# Patient Record
Sex: FEMALE | Race: WHITE | Employment: OTHER | URBAN - METROPOLITAN AREA
[De-identification: names, ages, dates, MRNs, and addresses within clinical notes are randomized per-mention and may not be internally consistent; named-entity substitution may affect disease eponyms.]

---

## 2019-01-23 ENCOUNTER — HOSPITAL ENCOUNTER (EMERGENCY)
Age: 74
Discharge: HOME | End: 2019-01-23
Payer: MEDICARE

## 2019-01-23 ENCOUNTER — APPOINTMENT (OUTPATIENT)
Dept: GENERAL RADIOLOGY | Age: 74
End: 2019-01-23
Payer: MEDICARE

## 2019-01-23 VITALS
DIASTOLIC BLOOD PRESSURE: 72 MMHG | OXYGEN SATURATION: 93 % | WEIGHT: 205 LBS | BODY MASS INDEX: 35.63 KG/M2 | TEMPERATURE: 98.2 F | HEART RATE: 77 BPM | RESPIRATION RATE: 18 BRPM | SYSTOLIC BLOOD PRESSURE: 123 MMHG

## 2019-01-23 DIAGNOSIS — J40 BRONCHITIS: Primary | ICD-10-CM

## 2019-01-23 PROCEDURE — 71046 X-RAY EXAM CHEST 2 VIEWS: CPT

## 2019-01-23 PROCEDURE — 99213 OFFICE O/P EST LOW 20 MIN: CPT

## 2019-01-23 PROCEDURE — 6370000000 HC RX 637 (ALT 250 FOR IP): Performed by: NURSE PRACTITIONER

## 2019-01-23 PROCEDURE — 94640 AIRWAY INHALATION TREATMENT: CPT

## 2019-01-23 RX ORDER — METHYLPREDNISOLONE 4 MG/1
TABLET ORAL
Qty: 21 TABLET | Status: SHIPPED | OUTPATIENT
Start: 2019-01-23 | End: 2019-01-29

## 2019-01-23 RX ORDER — IPRATROPIUM BROMIDE AND ALBUTEROL SULFATE 2.5; .5 MG/3ML; MG/3ML
1 SOLUTION RESPIRATORY (INHALATION) ONCE
Status: COMPLETED | OUTPATIENT
Start: 2019-01-23 | End: 2019-01-23

## 2019-01-23 RX ORDER — AZITHROMYCIN 250 MG/1
TABLET, FILM COATED ORAL
Qty: 6 TABLET | Refills: 0 | Status: SHIPPED | OUTPATIENT
Start: 2019-01-23 | End: 2019-02-02

## 2019-01-23 RX ORDER — BENZONATATE 100 MG/1
100 CAPSULE ORAL 3 TIMES DAILY PRN
Qty: 21 CAPSULE | Refills: 0 | Status: SHIPPED | OUTPATIENT
Start: 2019-01-23 | End: 2019-01-30

## 2019-01-23 RX ADMIN — IPRATROPIUM BROMIDE AND ALBUTEROL SULFATE 1 AMPULE: .5; 3 SOLUTION RESPIRATORY (INHALATION) at 13:01

## 2020-06-04 ENCOUNTER — HOSPITAL ENCOUNTER (OUTPATIENT)
Dept: MAMMOGRAPHY | Age: 75
Discharge: HOME | End: 2020-06-06
Payer: MEDICARE

## 2020-06-04 PROCEDURE — 77080 DXA BONE DENSITY AXIAL: CPT

## 2020-06-12 ENCOUNTER — HOSPITAL ENCOUNTER (OUTPATIENT)
Age: 75
Discharge: HOME | End: 2020-06-14
Payer: MEDICARE

## 2020-06-12 LAB
ALBUMIN SERPL-MCNC: 4.3 G/DL (ref 3.5–5.2)
ALP BLD-CCNC: 43 U/L (ref 35–104)
ALT SERPL-CCNC: 17 U/L (ref 0–32)
ANION GAP SERPL CALCULATED.3IONS-SCNC: 10 MMOL/L (ref 7–16)
AST SERPL-CCNC: 22 U/L (ref 0–31)
BASOPHILS ABSOLUTE: 0.04 E9/L (ref 0–0.2)
BASOPHILS RELATIVE PERCENT: 0.6 % (ref 0–2)
BILIRUB SERPL-MCNC: 0.6 MG/DL (ref 0–1.2)
BUN BLDV-MCNC: 24 MG/DL (ref 8–23)
CALCIUM SERPL-MCNC: 10 MG/DL (ref 8.6–10.2)
CHLORIDE BLD-SCNC: 101 MMOL/L (ref 98–107)
CHOLESTEROL, TOTAL: 149 MG/DL (ref 0–199)
CO2: 27 MMOL/L (ref 22–29)
CREAT SERPL-MCNC: 0.8 MG/DL (ref 0.5–1)
EOSINOPHILS ABSOLUTE: 0.25 E9/L (ref 0.05–0.5)
EOSINOPHILS RELATIVE PERCENT: 3.7 % (ref 0–6)
GFR AFRICAN AMERICAN: >60
GFR NON-AFRICAN AMERICAN: >60 ML/MIN/1.73
GLUCOSE BLD-MCNC: 106 MG/DL (ref 74–99)
HCT VFR BLD CALC: 42.7 % (ref 34–48)
HDLC SERPL-MCNC: 41 MG/DL
HEMOGLOBIN: 13.4 G/DL (ref 11.5–15.5)
IMMATURE GRANULOCYTES #: 0.03 E9/L
IMMATURE GRANULOCYTES %: 0.4 % (ref 0–5)
LDL CHOLESTEROL CALCULATED: 78 MG/DL (ref 0–99)
LYMPHOCYTES ABSOLUTE: 1.87 E9/L (ref 1.5–4)
LYMPHOCYTES RELATIVE PERCENT: 27.5 % (ref 20–42)
MCH RBC QN AUTO: 29.3 PG (ref 26–35)
MCHC RBC AUTO-ENTMCNC: 31.4 % (ref 32–34.5)
MCV RBC AUTO: 93.2 FL (ref 80–99.9)
MONOCYTES ABSOLUTE: 0.7 E9/L (ref 0.1–0.95)
MONOCYTES RELATIVE PERCENT: 10.3 % (ref 2–12)
NEUTROPHILS ABSOLUTE: 3.9 E9/L (ref 1.8–7.3)
NEUTROPHILS RELATIVE PERCENT: 57.5 % (ref 43–80)
PDW BLD-RTO: 14 FL (ref 11.5–15)
PLATELET # BLD: 204 E9/L (ref 130–450)
PMV BLD AUTO: 11 FL (ref 7–12)
POTASSIUM SERPL-SCNC: 4.9 MMOL/L (ref 3.5–5)
RBC # BLD: 4.58 E12/L (ref 3.5–5.5)
SODIUM BLD-SCNC: 138 MMOL/L (ref 132–146)
TOTAL PROTEIN: 6.9 G/DL (ref 6.4–8.3)
TRIGL SERPL-MCNC: 148 MG/DL (ref 0–149)
TSH SERPL DL<=0.05 MIU/L-ACNC: 2.69 UIU/ML (ref 0.27–4.2)
VLDLC SERPL CALC-MCNC: 30 MG/DL
WBC # BLD: 6.8 E9/L (ref 4.5–11.5)

## 2020-06-12 PROCEDURE — 80061 LIPID PANEL: CPT

## 2020-06-12 PROCEDURE — 36415 COLL VENOUS BLD VENIPUNCTURE: CPT

## 2020-06-12 PROCEDURE — 85025 COMPLETE CBC W/AUTO DIFF WBC: CPT

## 2020-06-12 PROCEDURE — 84436 ASSAY OF TOTAL THYROXINE: CPT

## 2020-06-12 PROCEDURE — 80053 COMPREHEN METABOLIC PANEL: CPT

## 2020-06-12 PROCEDURE — 84443 ASSAY THYROID STIM HORMONE: CPT

## 2020-06-13 LAB — T4 TOTAL: 8.4 MCG/DL (ref 4.5–11.7)

## 2021-01-22 ENCOUNTER — HOSPITAL ENCOUNTER (EMERGENCY)
Age: 76
Discharge: HOME | End: 2021-01-22
Attending: EMERGENCY MEDICINE
Payer: MEDICARE

## 2021-01-22 VITALS
DIASTOLIC BLOOD PRESSURE: 84 MMHG | RESPIRATION RATE: 20 BRPM | OXYGEN SATURATION: 96 % | TEMPERATURE: 97 F | HEART RATE: 73 BPM | SYSTOLIC BLOOD PRESSURE: 139 MMHG

## 2021-01-22 DIAGNOSIS — R19.8 SENSATION OF FOREIGN BODY IN ESOPHAGUS: ICD-10-CM

## 2021-01-22 DIAGNOSIS — R13.10 DYSPHAGIA, UNSPECIFIED TYPE: Primary | ICD-10-CM

## 2021-01-22 PROCEDURE — 6360000002 HC RX W HCPCS: Performed by: STUDENT IN AN ORGANIZED HEALTH CARE EDUCATION/TRAINING PROGRAM

## 2021-01-22 PROCEDURE — 96374 THER/PROPH/DIAG INJ IV PUSH: CPT

## 2021-01-22 PROCEDURE — 2500000003 HC RX 250 WO HCPCS: Performed by: STUDENT IN AN ORGANIZED HEALTH CARE EDUCATION/TRAINING PROGRAM

## 2021-01-22 PROCEDURE — 6370000000 HC RX 637 (ALT 250 FOR IP): Performed by: STUDENT IN AN ORGANIZED HEALTH CARE EDUCATION/TRAINING PROGRAM

## 2021-01-22 PROCEDURE — C9113 INJ PANTOPRAZOLE SODIUM, VIA: HCPCS | Performed by: STUDENT IN AN ORGANIZED HEALTH CARE EDUCATION/TRAINING PROGRAM

## 2021-01-22 PROCEDURE — 99284 EMERGENCY DEPT VISIT MOD MDM: CPT

## 2021-01-22 PROCEDURE — 96375 TX/PRO/DX INJ NEW DRUG ADDON: CPT

## 2021-01-22 RX ORDER — PANTOPRAZOLE SODIUM 40 MG/10ML
40 INJECTION, POWDER, LYOPHILIZED, FOR SOLUTION INTRAVENOUS ONCE
Status: COMPLETED | OUTPATIENT
Start: 2021-01-22 | End: 2021-01-22

## 2021-01-22 RX ORDER — PIOGLITAZONEHYDROCHLORIDE 30 MG/1
30 TABLET ORAL DAILY
COMMUNITY

## 2021-01-22 RX ADMIN — GLUCAGON HYDROCHLORIDE 1 MG: 1 INJECTION, POWDER, FOR SOLUTION INTRAMUSCULAR; INTRAVENOUS; SUBCUTANEOUS at 10:40

## 2021-01-22 RX ADMIN — LIDOCAINE HYDROCHLORIDE: 20 SOLUTION ORAL; TOPICAL at 10:39

## 2021-01-22 RX ADMIN — PANTOPRAZOLE SODIUM 40 MG: 40 INJECTION, POWDER, FOR SOLUTION INTRAVENOUS at 10:40

## 2021-01-22 NOTE — ED PROVIDER NOTES
The patient is a 66year-old-female who presents to the Emergency Department complaining of dysphagia. She states that she feels like something is stuck in her throat. Her symptoms were sudden in onset this morning just prior to arrival, constant, and mild in severity. Nothing makes it better or worse. She states that she ate an egg and toast this morning, and she felt like something was stuck shortly after that. This has happened to her multiple times in the past. She typically sees GI, Dr. Johan Sawyer. She states he has dilated her esophagus multiple times. She hasn't seen him in a \"while. \" She did try drinking water at home, but immediately spit it back out and was unable to keep it down. She states that her  also tried to push on her back without any relief. Nothing makes it better or worse. She denies any fever, chills, vomiting, diarrhea, abdominal pain, chest pain, shortness of breath, difficulty swallowing, or other acute symptoms or concerns. The history is provided by the patient and the spouse. Review of Systems   Constitutional: Negative for chills, fatigue and fever. HENT: Negative for congestion and trouble swallowing. Eyes: Negative for photophobia and visual disturbance. Respiratory: Negative for cough and shortness of breath. Cardiovascular: Negative for chest pain and leg swelling. Gastrointestinal: Negative for abdominal pain, diarrhea, nausea and vomiting. Dysphagia, feels like foreign body is in esophagus     Genitourinary: Negative for dysuria, flank pain, frequency and hematuria. Musculoskeletal: Negative for back pain and neck pain. Skin: Negative for rash and wound. Neurological: Negative for dizziness, syncope, weakness, light-headedness, numbness and headaches. All other systems reviewed and are negative. Physical Exam  Vitals signs and nursing note reviewed. Constitutional:       General: She is not in acute distress.      Appearance: She is well-developed. She is not ill-appearing, toxic-appearing or diaphoretic. HENT:      Head: Normocephalic and atraumatic. Nose: Nose normal.      Mouth/Throat:      Lips: Pink. No lesions. Mouth: Mucous membranes are moist.   Eyes:      General: Lids are normal.   Neck:      Musculoskeletal: Normal range of motion and neck supple. Cardiovascular:      Rate and Rhythm: Normal rate and regular rhythm. Heart sounds: Normal heart sounds, S1 normal and S2 normal. No murmur. Pulmonary:      Effort: Pulmonary effort is normal. No respiratory distress. Breath sounds: Normal breath sounds and air entry. No decreased breath sounds, wheezing, rhonchi or rales. Abdominal:      General: Bowel sounds are normal.      Palpations: Abdomen is soft. Tenderness: There is no abdominal tenderness. There is no guarding or rebound. Skin:     General: Skin is warm and dry. Neurological:      Mental Status: She is alert and oriented to person, place, and time. Motor: No tremor or abnormal muscle tone. Coordination: Coordination normal.          Procedures     MDM  Number of Diagnoses or Management Options  Dysphagia, unspecified type  Diagnosis management comments: The patient is a 66year-old-female who presents to the Emergency Department complaining that something is stuck in her esophagus. She is hemodynamically stable, non-toxic, and in no acute distress. Treated with a dose of glucagon, GI cocktail, and protonix. She was feeling better and able to tolerate some water. Did consult with Dr. Christian Degroot, who states that the patient could come to his office now and he would take a look. Patient's IV was left in place for her to go see Dr. Christian Degroot per his request. Discussed results and plan with patient and  at bedside and they verbalized understanding and agreement to plan and discharge instructions.         ED Course as of Jan 23 0751 Fri Jan 22, 2021   [facility] ATTESTATION:     I have personally performed and/or participated in the history, exam, medical decision making, and procedures and agree with all pertinent clinical information unless otherwise noted. I have also reviewed and agree with the past medical, family and social history unless otherwise noted. I have discussed this patient in detail with the resident, and provided the instruction and education regarding patient here complaining of food in her esophagus. Has a history of strictures, has had 2 have her esophagus dilated several times, sees Dr. Jeanette Beltrán. States for the last couple of months has been gradually worsening, difficulty swallowing with food getting stuck in her throat more often and having to move around and try to get the food to clear and go down. Got even worse overnight and this morning. No actual other chest pain, palpitations or shortness of breath. No abdominal pain. .  My findings/plan: Patient is sitting in the bed resting comfortably no distress controlling her own airway and controlling her own oral secretions. Abdomen soft and nontender. No trismus or stridor. Lungs are clear. Heart rate regular. No focal neurologic deficit. [NC]   5 Consulted with Dr. Kelli Henriquez, GI, who states the patient can come to his office now and he will see the patient. [KG]      ED Course User Index  [KG] Nyasia Roa DO  [NC] Mayte Mack DO          ED Course as of Jan 23 0751 Fri Jan 22, 2021   1021 ATTENDING PROVIDER ATTESTATION:     I have personally performed and/or participated in the history, exam, medical decision making, and procedures and agree with all pertinent clinical information unless otherwise noted. I have also reviewed and agree with the past medical, family and social history unless otherwise noted.     I have discussed this patient in detail with the resident, and provided the instruction and education regarding patient here complaining of AM  ED Bed Assignment:  VANDANA/VANDANA    The nursing notes within the ED encounter and vital signs as below have been reviewed. /84   Pulse 73   Temp 97 °F (36.1 °C) (Temporal)   Resp 20   SpO2 96%   Oxygen Saturation Interpretation: Normal      ------------------------------------------ PROGRESS NOTES ------------------------------------------  4:51 PM EST  I have spoken with the patient and discussed todays results, in addition to providing specific details for the plan of care and counseling regarding the diagnosis and prognosis. Their questions are answered at this time and they are agreeable with the plan. I discussed at length with them reasons for immediate return here for re evaluation. They will followup with their primary care physician by calling their office tomorrow. --------------------------------- ADDITIONAL PROVIDER NOTES ---------------------------------  At this time the patient is without objective evidence of an acute process requiring hospitalization or inpatient management. They have remained hemodynamically stable throughout their entire ED visit and are stable for discharge with outpatient follow-up. The plan has been discussed in detail and they are aware of the specific conditions for emergent return, as well as the importance of follow-up. Discharge Medication List as of 1/22/2021 11:23 AM          Diagnosis:  1. Dysphagia, unspecified type    2. Sensation of foreign body in esophagus        Disposition:  Patient's disposition: Discharge to home  Patient's condition is stable.        Noé Stephens DO  Resident  01/23/21 8783

## 2021-01-23 ASSESSMENT — ENCOUNTER SYMPTOMS
BACK PAIN: 0
NAUSEA: 0
SHORTNESS OF BREATH: 0
COUGH: 0
VOMITING: 0
TROUBLE SWALLOWING: 0
DIARRHEA: 0
ABDOMINAL PAIN: 0
PHOTOPHOBIA: 0

## 2021-11-02 ENCOUNTER — APPOINTMENT (OUTPATIENT)
Dept: GENERAL RADIOLOGY | Age: 76
End: 2021-11-02
Payer: MEDICARE

## 2021-11-02 ENCOUNTER — HOSPITAL ENCOUNTER (EMERGENCY)
Age: 76
Discharge: HOME | End: 2021-11-02
Attending: EMERGENCY MEDICINE
Payer: MEDICARE

## 2021-11-02 VITALS
DIASTOLIC BLOOD PRESSURE: 72 MMHG | WEIGHT: 215 LBS | TEMPERATURE: 97.6 F | SYSTOLIC BLOOD PRESSURE: 164 MMHG | HEIGHT: 63 IN | RESPIRATION RATE: 14 BRPM | OXYGEN SATURATION: 93 % | BODY MASS INDEX: 38.09 KG/M2 | HEART RATE: 74 BPM

## 2021-11-02 DIAGNOSIS — M54.6 ACUTE MIDLINE THORACIC BACK PAIN: Primary | ICD-10-CM

## 2021-11-02 DIAGNOSIS — M79.604 RIGHT LEG PAIN: ICD-10-CM

## 2021-11-02 DIAGNOSIS — R20.2 PARESTHESIAS: ICD-10-CM

## 2021-11-02 LAB
ANION GAP SERPL CALCULATED.3IONS-SCNC: 8 MMOL/L (ref 7–16)
BILIRUBIN URINE: NEGATIVE
BLOOD, URINE: NEGATIVE
BUN BLDV-MCNC: 30 MG/DL (ref 6–23)
CALCIUM SERPL-MCNC: 9.7 MG/DL (ref 8.6–10.2)
CHLORIDE BLD-SCNC: 106 MMOL/L (ref 98–107)
CLARITY: CLEAR
CO2: 25 MMOL/L (ref 22–29)
COLOR: NORMAL
CREAT SERPL-MCNC: 0.7 MG/DL (ref 0.5–1)
EKG ATRIAL RATE: 69 BPM
EKG P AXIS: 24 DEGREES
EKG P-R INTERVAL: 164 MS
EKG Q-T INTERVAL: 392 MS
EKG QRS DURATION: 90 MS
EKG QTC CALCULATION (BAZETT): 420 MS
EKG R AXIS: 2 DEGREES
EKG T AXIS: 1 DEGREES
EKG VENTRICULAR RATE: 69 BPM
GFR AFRICAN AMERICAN: >60
GFR NON-AFRICAN AMERICAN: >60 ML/MIN/1.73
GLUCOSE BLD-MCNC: 106 MG/DL (ref 74–99)
GLUCOSE URINE: NEGATIVE MG/DL
HCT VFR BLD CALC: 39.9 % (ref 34–48)
HEMOGLOBIN: 13.3 G/DL (ref 11.5–15.5)
KETONES, URINE: NEGATIVE MG/DL
LEUKOCYTE ESTERASE, URINE: NEGATIVE
MCH RBC QN AUTO: 30.3 PG (ref 26–35)
MCHC RBC AUTO-ENTMCNC: 33.3 % (ref 32–34.5)
MCV RBC AUTO: 90.9 FL (ref 80–99.9)
NITRITE, URINE: NEGATIVE
PDW BLD-RTO: 13.6 FL (ref 11.5–15)
PH UA: 7.5 (ref 5–9)
PLATELET # BLD: 194 E9/L (ref 130–450)
PMV BLD AUTO: 10.3 FL (ref 7–12)
POTASSIUM SERPL-SCNC: 4.2 MMOL/L (ref 3.5–5)
PROTEIN UA: NEGATIVE MG/DL
RBC # BLD: 4.39 E12/L (ref 3.5–5.5)
SODIUM BLD-SCNC: 139 MMOL/L (ref 132–146)
SPECIFIC GRAVITY UA: 1.01 (ref 1–1.03)
TROPONIN, HIGH SENSITIVITY: 10 NG/L (ref 0–9)
TROPONIN, HIGH SENSITIVITY: 12 NG/L (ref 0–9)
UROBILINOGEN, URINE: 0.2 E.U./DL
WBC # BLD: 6.5 E9/L (ref 4.5–11.5)

## 2021-11-02 PROCEDURE — 99284 EMERGENCY DEPT VISIT MOD MDM: CPT

## 2021-11-02 PROCEDURE — 81003 URINALYSIS AUTO W/O SCOPE: CPT

## 2021-11-02 PROCEDURE — 93005 ELECTROCARDIOGRAM TRACING: CPT | Performed by: EMERGENCY MEDICINE

## 2021-11-02 PROCEDURE — 36415 COLL VENOUS BLD VENIPUNCTURE: CPT

## 2021-11-02 PROCEDURE — 93010 ELECTROCARDIOGRAM REPORT: CPT | Performed by: INTERNAL MEDICINE

## 2021-11-02 PROCEDURE — 80048 BASIC METABOLIC PNL TOTAL CA: CPT

## 2021-11-02 PROCEDURE — 85027 COMPLETE CBC AUTOMATED: CPT

## 2021-11-02 PROCEDURE — 84484 ASSAY OF TROPONIN QUANT: CPT

## 2021-11-02 PROCEDURE — 71046 X-RAY EXAM CHEST 2 VIEWS: CPT

## 2021-11-02 RX ORDER — PANTOPRAZOLE SODIUM 40 MG/1
40 TABLET, DELAYED RELEASE ORAL DAILY
COMMUNITY

## 2021-11-02 ASSESSMENT — ENCOUNTER SYMPTOMS
SORE THROAT: 0
EYE REDNESS: 0
BACK PAIN: 1
COUGH: 0
EYE PAIN: 0
SINUS PRESSURE: 0
ABDOMINAL DISTENTION: 0
NAUSEA: 0
WHEEZING: 0
SHORTNESS OF BREATH: 1
DIARRHEA: 0
EYE DISCHARGE: 0
VOMITING: 0

## 2021-11-02 ASSESSMENT — PAIN DESCRIPTION - FREQUENCY: FREQUENCY: INTERMITTENT

## 2021-11-02 ASSESSMENT — PAIN SCALES - GENERAL: PAINLEVEL_OUTOF10: 6

## 2021-11-02 ASSESSMENT — PAIN DESCRIPTION - LOCATION: LOCATION: BACK;LEG

## 2021-11-02 ASSESSMENT — PAIN DESCRIPTION - ORIENTATION: ORIENTATION: MID;RIGHT

## 2021-11-02 ASSESSMENT — PAIN DESCRIPTION - PAIN TYPE: TYPE: ACUTE PAIN

## 2021-11-02 NOTE — ED PROVIDER NOTES
Patient is a 63 y/o female who presents to the ED with back pain and right leg pain. Patient states she woke up with pain between her shoulder blades. She also had pain in her right leg. She states that she had numbness in both feet and believes this is from her metformin. She became short of breath but now feels much better. She denies any chest pain. She denies any low back pain, weakness or loss of bowel or bladder function. Currently, her pain is 6/10. Review of Systems   Constitutional: Negative for chills and fever. HENT: Negative for ear pain, sinus pressure and sore throat. Eyes: Negative for pain, discharge and redness. Respiratory: Positive for shortness of breath. Negative for cough and wheezing. Cardiovascular: Negative for chest pain. Gastrointestinal: Negative for abdominal distention, diarrhea, nausea and vomiting. Genitourinary: Negative for dysuria and frequency. Musculoskeletal: Positive for back pain. Negative for arthralgias. Skin: Negative for rash and wound. Neurological: Positive for numbness. Negative for weakness and headaches. Hematological: Negative for adenopathy. All other systems reviewed and are negative. Physical Exam  Vitals and nursing note reviewed. Constitutional:       General: She is not in acute distress. Appearance: She is obese. HENT:      Head: Normocephalic and atraumatic. Right Ear: External ear normal.      Left Ear: External ear normal.      Nose: Nose normal.      Mouth/Throat:      Mouth: Mucous membranes are moist.   Eyes:      Conjunctiva/sclera: Conjunctivae normal.      Pupils: Pupils are equal, round, and reactive to light. Cardiovascular:      Rate and Rhythm: Normal rate and regular rhythm. Heart sounds: No murmur heard. Pulmonary:      Effort: Pulmonary effort is normal. No respiratory distress. Breath sounds: Normal breath sounds. No stridor. No wheezing, rhonchi or rales.    Abdominal: General: Bowel sounds are normal. There is no distension. Palpations: Abdomen is soft. Tenderness: There is no abdominal tenderness. There is no guarding. Musculoskeletal:         General: Normal range of motion. Cervical back: Normal range of motion and neck supple. Skin:     General: Skin is warm and dry. Neurological:      Mental Status: She is alert and oriented to person, place, and time. Sensory: No sensory deficit. Motor: Motor function is intact. Comments: No saddle anesthesia. Procedures     MDM          EKG:  Normal sinus rhythm with ventricular rate of 69. VA interval, QRS duration and QT interval within normal range. Normal axis. Nonspecific ST segment and T wave changes. No previous EKG.         --------------------------------------------- PAST HISTORY ---------------------------------------------  Past Medical History:  has a past medical history of Asthma, Diabetes mellitus ([facility] [address] and Hyperlipidemia. Past Surgical History:  has a past surgical history that includes knee surgery; Cholecystectomy; and Cardiac surgery. Social History:  reports that she has never smoked. She has never used smokeless tobacco. She reports that she does not drink alcohol and does not use drugs. Family History: family history is not on file. The patients home medications have been reviewed.     Allergies: Asa [aspirin], Pcn [penicillins], and Iodine    -------------------------------------------------- RESULTS -------------------------------------------------  Labs:  Results for orders placed or performed during the hospital encounter of 11/02/21   Urinalysis   Result Value Ref Range    Color, UA Straw Straw/Yellow    Clarity, UA Clear Clear    Glucose, Ur Negative Negative mg/dL    Bilirubin Urine Negative Negative    Ketones, Urine Negative Negative mg/dL    Specific Gravity, UA 1.010 1.005 - 1.030    Blood, Urine Negative Negative    pH, UA 7.5 5.0 - 9.0 Protein, UA Negative Negative mg/dL    Urobilinogen, Urine 0.2 <2.0 E.U./dL    Nitrite, Urine Negative Negative    Leukocyte Esterase, Urine Negative Negative   Basic metabolic panel   Result Value Ref Range    Sodium 139 132 - 146 mmol/L    Potassium 4.2 3.5 - 5.0 mmol/L    Chloride 106 98 - 107 mmol/L    CO2 25 22 - 29 mmol/L    Anion Gap 8 7 - 16 mmol/L    Glucose 106 (H) 74 - 99 mg/dL    BUN 30 (H) 6 - 23 mg/dL    CREATININE 0.7 0.5 - 1.0 mg/dL    GFR Non-African American >60 >=60 mL/min/1.73    GFR African American >60     Calcium 9.7 8.6 - 10.2 mg/dL   CBC   Result Value Ref Range    WBC 6.5 4.5 - 11.5 E9/L    RBC 4.39 3.50 - 5.50 E12/L    Hemoglobin 13.3 11.5 - 15.5 g/dL    Hematocrit 39.9 34.0 - 48.0 %    MCV 90.9 80.0 - 99.9 fL    MCH 30.3 26.0 - 35.0 pg    MCHC 33.3 32.0 - 34.5 %    RDW 13.6 11.5 - 15.0 fL    Platelets 239 911 - 067 E9/L    MPV 10.3 7.0 - 12.0 fL   Troponin   Result Value Ref Range    Troponin, High Sensitivity 12 (H) 0 - 9 ng/L   Troponin   Result Value Ref Range    Troponin, High Sensitivity 10 (H) 0 - 9 ng/L   EKG 12 Lead   Result Value Ref Range    Ventricular Rate 69 BPM    Atrial Rate 69 BPM    P-R Interval 164 ms    QRS Duration 90 ms    Q-T Interval 392 ms    QTc Calculation (Bazett) 420 ms    P Axis 24 degrees    R Axis 2 degrees    T Axis 1 degrees       Radiology:  XR CHEST (2 VW)   Final Result   No acute process. ------------------------- NURSING NOTES AND VITALS REVIEWED ---------------------------  Date / Time Roomed:  11/2/2021  3:41 AM  ED Bed Assignment:  [id number]    The nursing notes within the ED encounter and vital signs as below have been reviewed.    BP (!) 164/72   Pulse 74   Temp 97.6 °F (36.4 °C) (Oral)   Resp 14   Ht 5' 3\" (1.6 m)   Wt 215 lb (97.5 kg)   SpO2 93%   BMI 38.09 kg/m²   Oxygen Saturation Interpretation: Normal      ------------------------------------------ PROGRESS NOTES ------------------------------------------  I have spoken with the patient and discussed todays results, in addition to providing specific details for the plan of care and counseling regarding the diagnosis and prognosis. Their questions are answered at this time and they are agreeable with the plan. I discussed at length with them reasons for immediate return here for re evaluation. They will followup with primary care by calling their office tomorrow. --------------------------------- ADDITIONAL PROVIDER NOTES ---------------------------------  At this time the patient is without objective evidence of an acute process requiring hospitalization or inpatient management. They have remained hemodynamically stable throughout their entire ED visit and are stable for discharge with outpatient follow-up. The plan has been discussed in detail and they are aware of the specific conditions for emergent return, as well as the importance of follow-up. New Prescriptions    No medications on file       Diagnosis:  1. Acute midline thoracic back pain    2. Right leg pain    3. Paresthesias        Disposition:  Patient's disposition: Discharge to home  Patient's condition is stable.            [facility],   11/02/21 3072

## 2021-11-02 NOTE — ED NOTES
Radiology going to floor at this time, will get patient upon return.       Jaiden Siomns RN  11/02/21 [id number]

## 2021-11-10 ENCOUNTER — OFFICE VISIT (OUTPATIENT)
Dept: PHYSICAL MEDICINE AND REHAB | Age: 76
End: 2021-11-10
Payer: MEDICARE

## 2021-11-10 VITALS — BODY MASS INDEX: 38.09 KG/M2 | HEIGHT: 63 IN | WEIGHT: 215 LBS

## 2021-11-10 DIAGNOSIS — G62.9 PERIPHERAL POLYNEUROPATHY: Primary | ICD-10-CM

## 2021-11-10 DIAGNOSIS — M54.17 RADICULOPATHY, LUMBOSACRAL REGION: Primary | ICD-10-CM

## 2021-11-10 DIAGNOSIS — G62.9 PERIPHERAL POLYNEUROPATHY: ICD-10-CM

## 2021-11-10 PROCEDURE — 95886 MUSC TEST DONE W/N TEST COMP: CPT | Performed by: PHYSICAL MEDICINE & REHABILITATION

## 2021-11-10 PROCEDURE — 95910 NRV CNDJ TEST 7-8 STUDIES: CPT | Performed by: PHYSICAL MEDICINE & REHABILITATION

## 2021-11-10 PROCEDURE — 99203 OFFICE O/P NEW LOW 30 MIN: CPT | Performed by: PHYSICAL MEDICINE & REHABILITATION

## 2021-11-10 NOTE — PROGRESS NOTES
[facility]  Electrodiagnostic Laboratory  *Accredited by the [address] [place] [address] with exemplary status  [address] [facility] [address]  Phone: [phone]  Fax: [phone]      Date of Examination: 11/10/21  Patient Name: Shira Harmon  is a 64y.o. year old female who was seen today regarding   Chief Complaint   Patient presents with    Extremity Pain     shooting pain from ankle to knee. 1+ year of symp anf has been getting worse.  Numbness     bottom of feet goes numb and numbness travles up leg. fingers also numb     Extremity Weakness     decrease strength, has fallen a few times. The symptoms started after no injury. The symptoms are intermittent. Previous workup has included: none. Past Medical History:   Diagnosis Date    Asthma     Diabetes mellitus ([address]     Hyperlipidemia        Past Surgical History:   Procedure Laterality Date    CARDIAC SURGERY      stent    CHOLECYSTECTOMY      KNEE SURGERY         There is not family history of neuromuscular conditions. ROS: There has been no associated vision change, hearing change, speech abnormality, swallowing abnormality, or bowel or bladder dysfunction. Physical Exam: General: The patient is in no apparent distress. Height 5' 3\" (1.6 m), weight 215 lb (97.5 kg). MSK: There is no joint effusion, deformity, instability, swelling, erythema or warmth. AROM is full in the spine and extremities. Negative SLR. Neurologic:  No focal sensorimotor deficit. Reflexes 2+ and symmetric. Gait is normal.    Impression:     1. Radiculopathy, lumbosacral region        Plan:   · EMG is indicated to evaluate the above diagnosis. Orders Placed This Encounter   Procedures    GA NEEDLE EMG EA EXTREMTY W/PARASPINL AREA COMPLETE    GA MOTOR &/SENS 7-8 NRV CNDJ PRECONF ELTRODE LIMB     · EMG was done today and showed bilateral S1 radiculopathy.  The patient was educated about the diagnosis and the prognosis. · Recommend correlation with lumbar imaging. Consider PT. · Advised patient to follow up with referring provider. Thank you for allowing me to participate in the care of your patient.       Sincerely,     Charity Oro, DO

## 2021-11-10 NOTE — PROGRESS NOTES
[address] [facility]  Electrodiagnostic Laboratory  *Accredited by the [facility] with exemplary status  [address] [facility] [address]  Phone: [phone]  Fax: [phone]    Referring Provider: Bibiana Herndon DO  Primary Care Physician: Ifeanyi Falcon DO  Patient Name: Chito Muñiz  Patient YOB: 1945  Gender: female  BMI: Body mass index is 38.09 kg/m². Height 5' 3\" (1.6 m), weight 215 lb (97.5 kg). 11/10/2021    Description of clinical problem:   Chief Complaint   Patient presents with    Extremity Pain     shooting pain from ankle to knee. 1+ year of symp anf has been getting worse.  Numbness     bottom of feet goes numb and numbness travles up leg. fingers also numb     Extremity Weakness     decrease strength, has fallen a few times. Sensory NCS    Nerve / Sites Rec. Site Peak Lat PP Amp Segments Distance Velocity Temp.      ms µV  cm m/s °C   R Sural - Ankle (Calf)      Calf Ankle 4.43 5.5 Calf - Ankle 14 40 31   R Superficial peroneal - Ankle      Lat leg Ankle 3.85 5.5 Lat leg - Ankle 10 34 31          Motor NCS    Nerve / Sites Muscle Onset Amplitude Segments Distance Velocity Temp.     ms mV  cm m/s °C   R Peroneal - EDB      Ankle EDB 5.05 2.7 Ankle - EDB 8  31      Fib head EDB 10.73 2.7 Fib head - Ankle 25 44 31      Above Knee EDB 12.71 2.6 Above Knee - Fib head 10 51 31   R Tibial - AH      Ankle AH 9.22 0.5* Ankle - AH 8  31      Pop fossa AH 15.68 0.5* Pop fossa - Ankle 38 59 31        Nerve / Sites Muscle Onset Amplitude Segments Distance Velocity Temp.     ms mV  cm m/s °C   L Tibial - AH      Ankle AH 7.76 2.7 Ankle - AH 8  31      Pop fossa AH 17.45 2.0 Pop fossa - Ankle 38 39 31       F Wave      Nerve Fmin % F    ms %   R Peroneal - EDB 45.52 30   R Tibial - AH 63.18* 30   L Tibial - AH 57.40 70       H Reflex     Nerve H Lat    ms   R Tibial - Soleus NA*   L Tibial - Soleus 38.02       EMG      EMG Summary Table     Spontaneous MUAP Recruitment   Muscle Nerve Roots IA Fib PSW Fasc Amp Dur. PPP Pattern   R. Vastus medialis Femoral L2-L4 N None None None N N N N   R. Tibialis anterior Deep peroneal (Fibular) L4-L5 N None None None N N N N   R. Gastrocnemius (Medial head) Tibial S1-S2 N 2+ 2+ None N N N Discrete   R. Extensor hallucis longus Deep peroneal (Fibular) L5-S1 N None None None N N N Reduced   L. Vastus medialis Femoral L2-L4 N None None None N N N N   L. Tibialis anterior Deep peroneal (Fibular) L4-L5 N None None None N N N N   L. Gastrocnemius (Medial head) Tibial S1-S2 N 1+ 1+ None N N N Reduced   L. Extensor hallucis longus Deep peroneal (Fibular) L5-S1 N None None None N N 1+ Reduced   L. Lumbar paraspinals (low)  - N 2+ 2+ None N N N N   L. Lumbar paraspinals (mid)  - N None None None N N N N   R. Lumbar paraspinals (low)  - N 2+ 2+ None N N N N   R. Lumbar paraspinals (mid)  - N None None None N N N N   R. Gluteus karime Inferior gluteal L5-S2 N 2+ 2+ None N N N N   R. Gluteus medius Superior gluteal L4-S1 N None None None N N N N   L. Gluteus medius Superior gluteal L4-S1 N None None None N N N N   L. Gluteus karime Inferior gluteal L5-S2 N None None None N N N N      Study Limitations:  none    Summary of Findings:   Nerve conduction studies:   · The following nerve conduction studies were abnormal:   · The right tibial motor amplitude was low and F wave was prolonged. · The right tibial H reflex was absent. · All other nerve conduction studies listed in the table above were normal in latency, amplitude and conduction velocity. Needle EMG:   · Needle EMG was performed using a concentric needle. · The following abnormalities were seen on needle EMG: positive sharp waves, fibrillation potentials and decreased motor unit recruitment were present in the bilateral S1 myotome. There were polyphasic motor units in the left extensor hallucis longus.     All other muscles tested, as listed in the table above demonstrated normal amplitude, duration, phases and recruitment and no active denervation signs were seen. Diagnostic Interpretation: This study was abnormal.     Electrodiagnosis: There is electrodiagnostic evidence of a radiculopathy. · Location: bilateral S1.   · Nature: [ X ] Axonal   [  ] Demyelinating  [  ] Mixed axonal and demyelinating     [  ] Sensory [ X ] Motor               [  ] Mixed sensorimotor     [ X ] with active denervation       [  ] without active denervation  · Duration: Chronic  · Severity: moderate  · Prognosis: Poor. The prognosis for recovery of axonal lesions is poor and dependant on collateral sprouting and reinnervation. Previous Study: no      Follow up EMG is recommended if clinically warranted only. Technologist: Dania Mcguire  Physician:    Tavares Campos D.O., P.T. Board Certified Physical Medicine and Rehabilitation  Board Certified Electrodiagnostic Medicine      Nerve conduction studies and electromyography were performed according to our laboratory policies and procedures which can be provided upon request. All abnormal values are identified in the table.  Laboratory normal values can also be provided upon request.       Cc: DO Gordy Liu DO

## 2021-11-10 NOTE — PATIENT INSTRUCTIONS
Electrodiagnotic Laboratory  Accredited by the [facility] with Exemplary status  DEIDRE Cadet D.O. [facility]  [address] [facility] [address]  Phone: [phone]  Fax: [phone]        Today you had an electrodiagnostic exam which included nerve conduction studies (NCS) and electromyography (EMG). This test evaluated the electrical activity of your nerves and muscles to help determine if you have a nerve or muscle disease. This test can help determine the location and type of a nerve or muscle problem. This will help your referring doctor diagnose your condition and determine the appropriate next step in your treatment plan. After your test:    1. There are no long lasting side effects of the test.     2. You may resume your normal activities without restrictions. 3.  Resume any medications that were stopped for the test.     4  If you have sore areas or bruising in your muscles where the needle was placed, apply a cold pack to the sore area for 15-20 minutes three to four times a day as needed for pain. The soreness should go away in about 1-2 days. 5. Your results were provided  Briefly at the end of your test and the final detailed report will be provided to your referring physician, and/or primary care physician and any other parties you requested within 1-2 days of the examination. You may wish to contact your referring provider after a few days to determine what they would like you to do next. 6.  Please call [phone] with any questions or concerns and if you develop increased body temperature/fever, swelling, tenderness, increased pain and/or drainage from the sites where the needle was placed. Thank you for choosing us for your health care needs.

## 2022-04-08 ENCOUNTER — HOSPITAL ENCOUNTER (OUTPATIENT)
Dept: MAMMOGRAPHY | Age: 77
Discharge: HOME | End: 2022-04-10
Payer: MEDICARE

## 2022-04-08 VITALS — WEIGHT: 216 LBS | BODY MASS INDEX: 38.27 KG/M2 | HEIGHT: 63 IN

## 2022-04-08 DIAGNOSIS — Z12.31 VISIT FOR SCREENING MAMMOGRAM: ICD-10-CM

## 2022-04-08 PROCEDURE — 77063 BREAST TOMOSYNTHESIS BI: CPT

## 2022-10-26 ENCOUNTER — TELEPHONE (OUTPATIENT)
Dept: OTHER | Age: 77
End: 2022-10-26

## 2022-10-26 ENCOUNTER — APPOINTMENT (OUTPATIENT)
Dept: GENERAL RADIOLOGY | Age: 77
End: 2022-10-26
Payer: MEDICARE

## 2022-10-26 ENCOUNTER — HOSPITAL ENCOUNTER (EMERGENCY)
Age: 77
Discharge: LEFT BEFORE DISCHARGE | End: 2022-10-26
Attending: EMERGENCY MEDICINE
Payer: MEDICARE

## 2022-10-26 ENCOUNTER — APPOINTMENT (OUTPATIENT)
Dept: ULTRASOUND IMAGING | Age: 77
End: 2022-10-26
Payer: MEDICARE

## 2022-10-26 VITALS
SYSTOLIC BLOOD PRESSURE: 120 MMHG | RESPIRATION RATE: 20 BRPM | OXYGEN SATURATION: 97 % | HEART RATE: 87 BPM | TEMPERATURE: 98.3 F | DIASTOLIC BLOOD PRESSURE: 60 MMHG

## 2022-10-26 DIAGNOSIS — I50.9 ACUTE CONGESTIVE HEART FAILURE, UNSPECIFIED HEART FAILURE TYPE (HCC): Primary | ICD-10-CM

## 2022-10-26 LAB
ALBUMIN SERPL-MCNC: 3.9 G/DL (ref 3.5–5.2)
ALP BLD-CCNC: 53 U/L (ref 35–104)
ALT SERPL-CCNC: 17 U/L (ref 0–32)
ANION GAP SERPL CALCULATED.3IONS-SCNC: 9 MMOL/L (ref 7–16)
AST SERPL-CCNC: 26 U/L (ref 0–31)
BASOPHILS ABSOLUTE: 0.03 E9/L (ref 0–0.2)
BASOPHILS RELATIVE PERCENT: 0.5 % (ref 0–2)
BILIRUB SERPL-MCNC: 0.4 MG/DL (ref 0–1.2)
BUN BLDV-MCNC: 29 MG/DL (ref 6–23)
CALCIUM SERPL-MCNC: 9.5 MG/DL (ref 8.6–10.2)
CHLORIDE BLD-SCNC: 102 MMOL/L (ref 98–107)
CO2: 27 MMOL/L (ref 22–29)
CREAT SERPL-MCNC: 0.9 MG/DL (ref 0.5–1)
EOSINOPHILS ABSOLUTE: 0.17 E9/L (ref 0.05–0.5)
EOSINOPHILS RELATIVE PERCENT: 2.6 % (ref 0–6)
GFR SERPL CREATININE-BSD FRML MDRD: >60 ML/MIN/1.73
GLUCOSE BLD-MCNC: 120 MG/DL (ref 74–99)
HCT VFR BLD CALC: 33.4 % (ref 34–48)
HEMOGLOBIN: 10.7 G/DL (ref 11.5–15.5)
IMMATURE GRANULOCYTES #: 0.02 E9/L
IMMATURE GRANULOCYTES %: 0.3 % (ref 0–5)
LYMPHOCYTES ABSOLUTE: 1.19 E9/L (ref 1.5–4)
LYMPHOCYTES RELATIVE PERCENT: 17.9 % (ref 20–42)
MCH RBC QN AUTO: 30.1 PG (ref 26–35)
MCHC RBC AUTO-ENTMCNC: 32 % (ref 32–34.5)
MCV RBC AUTO: 93.8 FL (ref 80–99.9)
MONOCYTES ABSOLUTE: 0.78 E9/L (ref 0.1–0.95)
MONOCYTES RELATIVE PERCENT: 11.7 % (ref 2–12)
NEUTROPHILS ABSOLUTE: 4.47 E9/L (ref 1.8–7.3)
NEUTROPHILS RELATIVE PERCENT: 67 % (ref 43–80)
PDW BLD-RTO: 15.6 FL (ref 11.5–15)
PLATELET # BLD: 174 E9/L (ref 130–450)
PMV BLD AUTO: 10.8 FL (ref 7–12)
POTASSIUM REFLEX MAGNESIUM: 3.8 MMOL/L (ref 3.5–5)
PRO-BNP: 1799 PG/ML (ref 0–450)
RBC # BLD: 3.56 E12/L (ref 3.5–5.5)
SODIUM BLD-SCNC: 138 MMOL/L (ref 132–146)
TOTAL PROTEIN: 6.2 G/DL (ref 6.4–8.3)
TROPONIN, HIGH SENSITIVITY: 22 NG/L (ref 0–9)
TROPONIN, HIGH SENSITIVITY: 23 NG/L (ref 0–9)
WBC # BLD: 6.7 E9/L (ref 4.5–11.5)

## 2022-10-26 PROCEDURE — 36415 COLL VENOUS BLD VENIPUNCTURE: CPT

## 2022-10-26 PROCEDURE — 84484 ASSAY OF TROPONIN QUANT: CPT

## 2022-10-26 PROCEDURE — 71046 X-RAY EXAM CHEST 2 VIEWS: CPT

## 2022-10-26 PROCEDURE — 80053 COMPREHEN METABOLIC PANEL: CPT

## 2022-10-26 PROCEDURE — 2500000003 HC RX 250 WO HCPCS: Performed by: STUDENT IN AN ORGANIZED HEALTH CARE EDUCATION/TRAINING PROGRAM

## 2022-10-26 PROCEDURE — 85025 COMPLETE CBC W/AUTO DIFF WBC: CPT

## 2022-10-26 PROCEDURE — 73590 X-RAY EXAM OF LOWER LEG: CPT

## 2022-10-26 PROCEDURE — 99285 EMERGENCY DEPT VISIT HI MDM: CPT

## 2022-10-26 PROCEDURE — 83880 ASSAY OF NATRIURETIC PEPTIDE: CPT

## 2022-10-26 PROCEDURE — 96374 THER/PROPH/DIAG INJ IV PUSH: CPT

## 2022-10-26 PROCEDURE — 93970 EXTREMITY STUDY: CPT

## 2022-10-26 PROCEDURE — 93005 ELECTROCARDIOGRAM TRACING: CPT | Performed by: STUDENT IN AN ORGANIZED HEALTH CARE EDUCATION/TRAINING PROGRAM

## 2022-10-26 RX ORDER — BUMETANIDE 1 MG/1
1 TABLET ORAL DAILY
Qty: 14 TABLET | Refills: 0 | Status: SHIPPED | OUTPATIENT
Start: 2022-10-26 | End: 2022-11-18

## 2022-10-26 RX ORDER — BUMETANIDE 0.25 MG/ML
1 INJECTION, SOLUTION INTRAMUSCULAR; INTRAVENOUS ONCE
Status: COMPLETED | OUTPATIENT
Start: 2022-10-26 | End: 2022-10-26

## 2022-10-26 RX ADMIN — BUMETANIDE 1 MG: 0.25 INJECTION, SOLUTION INTRAMUSCULAR; INTRAVENOUS at 11:33

## 2022-10-26 ASSESSMENT — PAIN - FUNCTIONAL ASSESSMENT: PAIN_FUNCTIONAL_ASSESSMENT: 0-10

## 2022-10-26 ASSESSMENT — PAIN SCALES - GENERAL: PAINLEVEL_OUTOF10: 8

## 2022-10-26 NOTE — CARE COORDINATION
SS Note: Pt here for leg swelling. Dr. Damir Galvin- cardiologist wanted pt to be admitted but pt declines. Pt is candidate for CHF clinic. SW met w/pt in ED 19 for transition of care planning. SW spoke from door wearing mask/PPE and explained role. Pt's spouse SO @ bedside & pt gives permission to speak openly. SW talked about CHF clinic and answered her and her 's questions. Pt is amenable to participating @ CHF clinic.     Kristi Anderson called Carlos @ CHF clinic & reviewed referral. Reviewed criteria for admission & they will need to call Dr. Damir Galvin to confirm he will sign off. She noted will need order placed in Epic. Jaleel Cunha, DO having difficulty placing correct order for CHF clinic; only able to place CHF referral. SW called Carlos and she noted that Buford Ganser will come to ED to assist w/order. Buford Ganser here and was able to place order for CHF clinic. Jaleel Cunha, DO still unable to do so and will need to have IT work on this. Pt will be called for appt time once clinic speaks to Dr. Damir Galvin.   Electronically signed by GORDO Alexander on 10/26/2022 at 3:46 PM

## 2022-10-26 NOTE — ED PROVIDER NOTES
Department of Emergency Medicine   ED Provider Note  Admit Date/RoomTime: 10/26/2022  8:32 AM  ED Room: VANDANA/VANDANA          History of Present Illness:  10/26/22, Time: 8:38 AM EDT         Tadeo Ny is a 76 y.o. female with history of type 2 diabetes, asthma, coronary artery disease, hyperlipidemia, hypertension, presenting to the ED for leg swelling, beginning about 1 month ago. The complaint has been persistent, moderate in severity, and worsened by nothing. Patient states for the last month or so she has had bilateral leg swelling. She states that about 2 weeks ago she tripped going up the stairs and hit the left shin on a stair, has had a small bruise there since. Has been ciprofloxacin/Flagyl for treatment of diverticulitis by her GI doctor. She follows with cardiology for her history of coronary artery disease, sees Dr. Mansi Partida. She normally does not have issues with edema. She was originally started on 0.5 mg of Bumex every other day several days ago, messaged her cardiologist and was told to increase it to every day. She has had good urine output, but no change in her leg swelling. She has not tried using compression stockings or elevation. She denies any fevers or chills. Denies any shortness of breath. Denies any orthopnea or paroxysmal nocturnal dyspnea. She does state that she chronically sleeps with a wedge, slightly sitting up at night, but this is not any different than usual.  She is able to ambulate with use of a cane, able to bear weight. No history of DVT or PE. No chest pain. No lightheadedness or syncope. Review of Systems:     Pertinent positives and negatives are stated within HPI. 10 point ROS otherwise negative.  --------------------------------------------- PAST HISTORY ---------------------------------------------  Past Medical History:  has a past medical history of Asthma, Diabetes mellitus ([address] and Hyperlipidemia.     Past Surgical History:  has a past surgical history that includes knee surgery; Cholecystectomy; and Cardiac surgery. Social History:  reports that she has never smoked. She has never used smokeless tobacco. She reports that she does not drink alcohol and does not use drugs. Family History: family history includes Breast Cancer (age of onset: 62) in her mother; Colon Cancer in her sister. The patients home medications have been reviewed. Allergies: Asa [aspirin], Pcn [penicillins], and Iodine        ---------------------------------------------------PHYSICAL EXAM--------------------------------------    Constitutional/General: AAO to person/place/time/purpose, NAD, no labored breathing  Head: Normocephalic and atraumatic  Eyes: EOMI, conjunctiva normal, sclera non icteric  Mouth: Moist mucous membranes, uvula midline  Neck: Supple, no stridor, no meningeal signs  Respiratory: Lungs clear to auscultation bilaterally, no wheezes, rales, or rhonchi. Not in respiratory distress  Cardiovascular:  Regular rate. Regular rhythm. No murmurs, no gallops, or rubs. 2+ distal pulses. Equal extremity pulses. Chest: No chest wall tenderness or deformity  GI:  Abdomen Soft, Non tender, Non distended. No rebound, guarding, or rigidity. No pulsatile masses. Musculoskeletal: Moves all extremities x 4. Warm and well perfused, no clubbing, cyanosis. 3+ pitting edema bilateral pretibial. capillary refill <3 seconds  Integument: skin warm and dry. Small ecchymotic area left mid tibia. Scattered erythema bilateral lower legs, appearance of chronic venous stasis dermatitis. See photo for details. Neurologic: GCS 15, no focal deficits, symmetric strength 5/5 in the major muscle groups of upper and lower extremities bilaterally  Psychiatric: Normal Affect        Informed consent. The patient has given verbal consent to have photos taken of their legs and inserted into their ED provider note as part of their permanent medical record.  The patient did view the photo and deemed it appropriate prior to inclusion in their chart.     -------------------------------------------------- RESULTS -------------------------------------------------  I have personally reviewed all laboratory and imaging results for this patient. Results are listed below.      LABS:  Results for orders placed or performed during the hospital encounter of 10/26/22   CBC with Auto Differential   Result Value Ref Range    WBC 6.7 4.5 - 11.5 E9/L    RBC 3.56 3.50 - 5.50 E12/L    Hemoglobin 10.7 (L) 11.5 - 15.5 g/dL    Hematocrit 33.4 (L) 34.0 - 48.0 %    MCV 93.8 80.0 - 99.9 fL    MCH 30.1 26.0 - 35.0 pg    MCHC 32.0 32.0 - 34.5 %    RDW 15.6 (H) 11.5 - 15.0 fL    Platelets 613 918 - 223 E9/L    MPV 10.8 7.0 - 12.0 fL    Neutrophils % 67.0 43.0 - 80.0 %    Immature Granulocytes % 0.3 0.0 - 5.0 %    Lymphocytes % 17.9 (L) 20.0 - 42.0 %    Monocytes % 11.7 2.0 - 12.0 %    Eosinophils % 2.6 0.0 - 6.0 %    Basophils % 0.5 0.0 - 2.0 %    Neutrophils Absolute 4.47 1.80 - 7.30 E9/L    Immature Granulocytes # 0.02 E9/L    Lymphocytes Absolute 1.19 (L) 1.50 - 4.00 E9/L    Monocytes Absolute 0.78 0.10 - 0.95 E9/L    Eosinophils Absolute 0.17 0.05 - 0.50 E9/L    Basophils Absolute 0.03 0.00 - 0.20 E9/L   Comprehensive Metabolic Panel w/ Reflex to MG   Result Value Ref Range    Sodium 138 132 - 146 mmol/L    Potassium reflex Magnesium 3.8 3.5 - 5.0 mmol/L    Chloride 102 98 - 107 mmol/L    CO2 27 22 - 29 mmol/L    Anion Gap 9 7 - 16 mmol/L    Glucose 120 (H) 74 - 99 mg/dL    BUN 29 (H) 6 - 23 mg/dL    Creatinine 0.9 0.5 - 1.0 mg/dL    Est, Glom Filt Rate >60 >=60 mL/min/1.73    Calcium 9.5 8.6 - 10.2 mg/dL    Total Protein 6.2 (L) 6.4 - 8.3 g/dL    Albumin 3.9 3.5 - 5.2 g/dL    Total Bilirubin 0.4 0.0 - 1.2 mg/dL    Alkaline Phosphatase 53 35 - 104 U/L    ALT 17 0 - 32 U/L    AST 26 0 - 31 U/L   Troponin   Result Value Ref Range    Troponin, High Sensitivity 23 (H) 0 - 9 ng/L   Brain Natriuretic Peptide   Result Value Ref Range    Pro-BNP 1,799 (H) 0 - 450 pg/mL   Troponin   Result Value Ref Range    Troponin, High Sensitivity 22 (H) 0 - 9 ng/L   EKG 12 Lead   Result Value Ref Range    Ventricular Rate 79 BPM    Atrial Rate 79 BPM    P-R Interval 164 ms    QRS Duration 90 ms    Q-T Interval 384 ms    QTc Calculation (Bazett) 440 ms    P Axis 65 degrees    R Axis 52 degrees    T Axis -44 degrees       RADIOLOGY:  Interpreted by Radiologist unless otherwise specified  US DUP LOWER EXTREMITIES BILATERAL VENOUS   Final Result   No evidence of DVT in either lower extremity. RECOMMENDATIONS:   Unavailable         XR TIBIA FIBULA LEFT (2 VIEWS)   Final Result   No acute fracture or dislocation. XR CHEST (2 VW)   Final Result   Cardiomegaly with mild CHF               EKG:    See ED Course for EKG documentation        ------------------------- NURSING NOTES AND VITALS REVIEWED ---------------------------   The nursing notes within the ED encounter and vital signs as below have been reviewed by myself. /60   Pulse 87   Temp 98.3 °F (36.8 °C)   Resp 20   SpO2 97%   Oxygen Saturation Interpretation: Normal    The patients available past medical records and past encounters were reviewed. ------------------------------ ED COURSE/MEDICAL DECISION MAKING----------------------  Medications   bumetanide (BUMEX) injection 1 mg (1 mg IntraVENous Given 10/26/22 1133)            Medical Decision Making: This is a 15-year-old female presents emerged department for lower extremity swelling. Patient with no known history of CHF. Chest x-ray with mild pulmonary vascular congestion, significant pitting edema on bilateral lower extremities. Patient with no hypoxia with ambulation. She is normotensive, afebrile, nontachycardic, nontoxic-appearing. Lower extremities with some erythema, appearance of venous stasis changes, no evidence of cellulitis. No leukocytosis, patient afebrile.   Troponin mildly elevated, stable on repeat. EKG without acute ischemic changes. Discussed with patient's cardiologist, recommended admission for IV diuresis. Patient did not wish to stay in the hospital, wishes to follow-up outpatient with her cardiologist.  Patient will be started on Bumex daily as she was previously taking it every other day, was arranged to follow-up with CHF clinic as well as her cardiologist, left the emergency department [address], see MDM for further details    See ED COURSE for additional MDM. Labs & imaging reviewed and interpreted, see RESULTS above. Re-Evaluations:             ED Course as of 10/26/22 1625   Wed Oct 26, 2022   1015 EKG: This EKG is signed and interpreted by me. Rate: 79  Rhythm: Sinus  Interpretation: no acute changes, no acute ischemic changes, diffusely low voltage QRS complexes, nonspecific EKG  Comparison: stable as compared to patient's most recent EKG on 11/2/21   [RH]   1021 Hemoglobin Quant(!): 10.7  Previous hemoglobin 13.1 seven months prior, all previous hemoglobins previously stable at 13.x [RH]   1022 Patient follows with: Tico Victor MD for Cardiology     [RH]   [phone] Patient tolerated ambulation, maintain pulse ox of 95% on room air. [RH]   6810 Rectal exam performed with female RN, patient with small amount of brown stool, Hemoccult negative. Discussed plan and results of labs and imaging with patient. [RH]   1400 Discussed case with cardiologist Dr. Johnnye Moritz, discussed outpatient follow-up versus admission. Given degree of patient's edema, new onset CHF, he would like patient admitted for IV Lasix, diuresis. [RH]   2166 Evaluated patient and updated on recommendation for admission from cardiologist.  Discussed with patient and . Patient does not wish to stay in the hospital, discussed risks of leaving [address] including worsening CHF, possible cardiac arrest or worsening kidney function, renal failure.   Discussed reasons to return to the emergency department. Patient with full capacity at this time, normal mental status, understands risks of outpatient follow-up, understands to return to the emergency department anytime for further work-up, treatment. Will prescribe patient diuretics, follow-up with CHF clinic, [RH]      ED Course User Index  [RH] 600 RANULFO Orellana DO         This patient's ED course included: a personal history and physicial examination, re-evaluation prior to disposition, multiple bedside re-evaluations, IV medications, cardiac monitoring, and continuous pulse oximetry    This patient has remained hemodynamically stable during their ED course. Consultations:  See ED Course    Counseling: The emergency provider has spoken with the patient and spouse and discussed todays results, in addition to providing specific details for the plan of care and counseling regarding the diagnosis and prognosis. Questions are answered at this time and they are agreeable with the plan.       --------------------------------- IMPRESSION AND DISPOSITION ---------------------------------    IMPRESSION  1. Acute congestive heart failure, unspecified heart failure type ([address]        DISPOSITION  Disposition: Discharge to home  Patient condition is stable    NOTE: This report was transcribed using voice recognition software. Every effort was made to ensure accuracy; however, inadvertent computerized transcription errors may be present. Also please note that patient was seen and examined by attending physician. Plan of care and disposition discussed with attending physician and they were immediately available or present for all procedures performed.        -- Fco Leslie D.O. PGY-3     Resident Physician     Emergency Medicine      10/26/2022 4:25 PM         600 RANULFO Orellana DO  Resident  10/26/22 [id number]

## 2022-10-26 NOTE — TELEPHONE ENCOUNTER
1430   Received a phone call from [facility] , Yael Harrison, re: patient being discharged from the [facility] ED and to become establish with CHF clinic. Patient is established with Dr. Kandace Sheppard. CHF clinic order form faxed to Dr. Kandace Sheppard to sign and return to CHF clinic. Awaiting return orders to become established with the CHF clinic.     5:11 PM Called patient re: scheduling an appointment and giving patient heart failure education over the phone. Patient did not answer at this time. Left a voicemail to call  CHF clinic back and provided a contact number.

## 2022-10-27 LAB
EKG ATRIAL RATE: 79 BPM
EKG P AXIS: 65 DEGREES
EKG P-R INTERVAL: 164 MS
EKG Q-T INTERVAL: 384 MS
EKG QRS DURATION: 90 MS
EKG QTC CALCULATION (BAZETT): 440 MS
EKG R AXIS: 52 DEGREES
EKG T AXIS: -44 DEGREES
EKG VENTRICULAR RATE: 79 BPM

## 2022-10-27 PROCEDURE — 93010 ELECTROCARDIOGRAM REPORT: CPT | Performed by: INTERNAL MEDICINE

## 2022-11-08 ENCOUNTER — HOSPITAL ENCOUNTER (OUTPATIENT)
Dept: OTHER | Age: 77
Setting detail: THERAPIES SERIES
Discharge: HOME | End: 2022-11-08
Payer: MEDICARE

## 2022-11-08 VITALS
RESPIRATION RATE: 18 BRPM | WEIGHT: 213 LBS | DIASTOLIC BLOOD PRESSURE: 54 MMHG | SYSTOLIC BLOOD PRESSURE: 110 MMHG | BODY MASS INDEX: 37.73 KG/M2 | OXYGEN SATURATION: 97 % | HEART RATE: 72 BPM

## 2022-11-08 LAB
ANION GAP SERPL CALCULATED.3IONS-SCNC: 13 MMOL/L (ref 7–16)
BUN BLDV-MCNC: 22 MG/DL (ref 6–23)
CALCIUM SERPL-MCNC: 9.2 MG/DL (ref 8.6–10.2)
CHLORIDE BLD-SCNC: 102 MMOL/L (ref 98–107)
CO2: 24 MMOL/L (ref 22–29)
CREAT SERPL-MCNC: 0.7 MG/DL (ref 0.5–1)
GFR SERPL CREATININE-BSD FRML MDRD: >60 ML/MIN/1.73
GLUCOSE BLD-MCNC: 93 MG/DL (ref 74–99)
POTASSIUM SERPL-SCNC: 3.9 MMOL/L (ref 3.5–5)
PRO-BNP: 1634 PG/ML (ref 0–450)
SODIUM BLD-SCNC: 139 MMOL/L (ref 132–146)

## 2022-11-08 PROCEDURE — 36415 COLL VENOUS BLD VENIPUNCTURE: CPT

## 2022-11-08 PROCEDURE — 99204 OFFICE O/P NEW MOD 45 MIN: CPT

## 2022-11-08 PROCEDURE — 83880 ASSAY OF NATRIURETIC PEPTIDE: CPT

## 2022-11-08 PROCEDURE — 96374 THER/PROPH/DIAG INJ IV PUSH: CPT

## 2022-11-08 PROCEDURE — 80048 BASIC METABOLIC PNL TOTAL CA: CPT

## 2022-11-08 RX ORDER — SODIUM CHLORIDE 0.9 % (FLUSH) 0.9 %
10 SYRINGE (ML) INJECTION ONCE
Status: DISCONTINUED | OUTPATIENT
Start: 2022-11-08 | End: 2022-11-09 | Stop reason: HOSPADM

## 2022-11-08 RX ORDER — CEPHALEXIN 500 MG/1
500 CAPSULE ORAL 4 TIMES DAILY
COMMUNITY

## 2022-11-08 RX ORDER — BUMETANIDE 0.25 MG/ML
2 INJECTION, SOLUTION INTRAMUSCULAR; INTRAVENOUS ONCE
Status: DISCONTINUED | OUTPATIENT
Start: 2022-11-08 | End: 2022-11-09 | Stop reason: HOSPADM

## 2022-11-08 NOTE — PROGRESS NOTES
Labs and assessment/ note faxed to Dr. Lorenzo Alfaro at this time.      Electronically signed by Chano Duque RN on 11/8/2022 at 12:32 PM

## 2022-11-08 NOTE — PLAN OF CARE
Problem: Chronic Conditions and Co-morbidities  Goal: Patient's chronic conditions and co-morbidity symptoms are monitored and maintained or improved  Outcome: Progressing  Flowsheets (Taken 11/8/2022 1115)  Care Plan - Patient's Chronic Conditions and Co-Morbidity Symptoms are Monitored and Maintained or Improved: Monitor and assess patient's chronic conditions and comorbid symptoms for stability, deterioration, or improvement

## 2022-11-08 NOTE — PROGRESS NOTES
Congestive Heart Failure Trice [address]   1945          Referring Provider: Dr. Conway Or with ED  Primary Care Physician: Dr. Mark Ramey  Cardiologist: Dr. Devante Reese    History of Present Illness:    Rachell Flynn is a 76 y.o. female with a history of type 2 diabetes, asthma, coronary artery disease, hyperlipidemia, hypertension, and CAD (follows with Dr. Devante Reese) was referred from ED physician at [address] [place] [address],[address] when patient declined admission to hospital for IV diuretic therapy on 10/26. Patient Story:  She does not  have dyspnea with exertion, shortness of breath, or decline in overall functional capacity. She does not have orthopnea, PND, nocturnal cough or hemoptysis. She does have abdominal distention or bloating, early satiety, anorexia/change in appetite. She does has a good urinary response to  oral diuretic. She does have pitting (2+) lower extremity edema. She complains of lightheadedness, dizziness. She denies palpitations, syncope or near syncope. She does not complain of chest pain, pressure, discomfort.          Allergies   Allergen Reactions    Asa [Aspirin] Shortness Of Breath    Pcn [Penicillins] Swelling    Iodine Rash         Outpatient Medications Marked as Taking for the 11/8/22 encounter [facility] Encounter) with [facility] CHF ROOM 1   Medication Sig Dispense Refill    cephALEXin (KEFLEX) 500 MG capsule Take 500 mg by mouth 4 times daily       Current Outpatient Medications on File Prior to Encounter   Medication Sig Dispense Refill    cephALEXin (KEFLEX) 500 MG capsule Take 500 mg by mouth 4 times daily      bumetanide (BUMEX) 1 MG tablet Take 1 tablet by mouth daily for 14 days 14 tablet 0    pantoprazole (PROTONIX) 40 MG tablet Take 40 mg by mouth daily      pioglitazone (ACTOS) 30 MG tablet Take 30 mg by mouth daily      clopidogrel (PLAVIX) 75 MG tablet Take 75 mg by mouth daily      atorvastatin (LIPITOR) 10 MG tablet Take 10 mg by mouth daily      atenolol (TENORMIN) 25 MG tablet Take 25 mg by mouth daily      budesonide-formoterol (SYMBICORT) 160-4.5 MCG/ACT AERO Inhale 2 puffs into the lungs 2 times daily      albuterol sulfate  (90 Base) MCG/ACT inhaler Inhale 2 puffs into the lungs every 6 hours as needed for Wheezing      azelastine (ASTELIN) 0.1 % nasal spray 1 spray by Nasal route 2 times daily Use in each nostril as directed      alendronate (FOSAMAX) 70 MG tablet Take 70 mg by mouth every 7 days      lisinopril (PRINIVIL;ZESTRIL) 10 MG tablet Take 10 mg by mouth daily. Multiple Vitamins-Minerals (CENTRUM SILVER PO) Take  by mouth daily. Misc Natural Products (OSTEO BI-FLEX ADV JOINT SHIELD PO) Take  by mouth. Calcium Carbonate-Vitamin D (CALTRATE 600+D PO) Take  by mouth daily. metformin (GLUCOPHAGE) 1000 MG tablet Take 1,000 mg by mouth 2 times daily (with meals). Montelukast Sodium (SINGULAIR PO) Take  by mouth.      meloxicam (MOBIC) 15 MG tablet Take 15 mg by mouth daily. omeprazole (PRILOSEC) 20 MG capsule Take 20 mg by mouth daily. Tiotropium Bromide Monohydrate (SPIRIVA HANDIHALER IN) Inhale  into the lungs. No current facility-administered medications on file prior to encounter. Guideline directed medical:  ARNI/ACE I/ARB: Yes  Beta blocker:   Yes  Aldosterone antagonist:  No  SGLT2i:  No        Physical Examination:     BP (!) 110/54   Pulse 72   Resp 18   Wt 213 lb (96.6 kg)   SpO2 97%   BMI 37.73 kg/m²     Assessment  Charting Type: Shift assessment (chf clinic)    Neurological  Level of Consciousness: Alert (0)                     Cardiac  Cardiac Regularity: Regular  Heart Sounds: S1, S2  Cardiac Rhythm: Sinus rhythm    Rhythm Interpretation  Heart Rate: 72         Gastrointestinal  Abdominal (WDL): Exceptions to WDL  Abdomen Inspection: Soft  GI Symptoms: Loss of appetite     Gastrointestinal  GI Symptoms: Loss of appetite         Peripheral Vascular  Peripheral Vascular (WDL): Exceptions to WDL  RLE Edema: +2, Pitting  LLE Edema: +2, Pitting              Musculoskeletal  RL Extremity: Weakness, Swelling (cane)  LL Extremity: Weakness, Swelling (cane)    Genitourinary  Genitourinary (WDL): Within Defined Limits    Psychosocial  Psychosocial (WDL): Within Defined Limits                        Heart Rate: 72                     LAB DATA:    Last 3 BMP      Sodium (mmol/L)   Date Value   10/26/2022 138   03/11/2022 140   11/02/2021 139     Potassium (mmol/L)   Date Value   03/11/2022 4.4   11/02/2021 4.2   08/03/2021 4.5     Potassium reflex Magnesium (mmol/L)   Date Value   10/26/2022 3.8     Chloride (mmol/L)   Date Value   10/26/2022 102   03/11/2022 103   11/02/2021 106     CO2 (mmol/L)   Date Value   10/26/2022 27   03/11/2022 29   11/02/2021 25     BUN (mg/dL)   Date Value   10/26/2022 29 (H)   03/11/2022 27 (H)   11/02/2021 30 (H)     Glucose (mg/dL)   Date Value   10/26/2022 120 (H)   03/11/2022 101 (H)   11/02/2021 106 (H)     Calcium (mg/dL)   Date Value   10/26/2022 9.5   03/11/2022 9.8   11/02/2021 9.7       Last 3 BNP       Pro-BNP (pg/mL)   Date Value   10/26/2022 1,799 (H)          CBC: No results for input(s): WBC, HGB, PLT in the last 72 hours. BMP:  No results for input(s): NA, K, CL, CO2, BUN, CREATININE, GLUCOSE in the last 72 hours. Hepatic: No results for input(s): AST, ALT, ALB, BILITOT, ALKPHOS in the last 72 hours. Troponin: No results for input(s): TROPONINI in the last 72 hours. BNP: No results for input(s): BNP in the last 72 hours. Lipids: No results for input(s): CHOL, HDL in the last 72 hours. Invalid input(s): LDLCALCU  INR: No results for input(s): INR in the last 72 hours.         WEIGHTS:    Wt Readings from Last 3 Encounters:   11/08/22 213 lb (96.6 kg)   04/08/22 216 lb (98 kg)   11/10/21 215 lb (97.5 kg)         TELEMETRY:  Cardiac Regularity: Regular  Cardiac Rhythm/Interpretation: SR        ASSESSMENT:  Malissa Grier Jessica Espinosa presents for CHF clinic initial visit after being referred from [address] [place], [place] [address] ED due to pitting lower extremity edema. Cardiologist at that time recommended admission to hospital for IV diuretic therapy however patient declined and agreed to follow with CHF clinic. In ED pro-BNP 1799. Today patient is with 2+ pitting edema and complaint of abdominal bloat. Agreeable to IVP diuretic. Follow up made next week to assess effect of IV diuretic. Discussed with patient the purpose of CHF clinic and importance of daily weights and doing a self check every day to monitor for changes. Went over the three heart failure zones and to call cardiologist if in yellow zone immediately to prevent any further decline. Patient has a working scale. Patient is agreeable to future CHF clinic visits. Interventions completed this visit:  IV diuretics given yes- IVP Bumex 2 mg once  Lab work obtained yes, pro-BNP, BMP  Reviewed currently prescribed medications with patient, educated on importance of compliance and answered any questions regarding their medication  Educated on signs and symptoms of HF  Educated on low sodium diet    PLAN:  Scheduled to follow up in CHF clinic on   Future Appointments   Date Time Provider Katherin Rogers   11/18/2022  8:30 AM [facility] CHF ROOM [address]     Given clinic phone number and aware of signs and symptoms to call with any HF change in symptoms.

## 2022-11-18 ENCOUNTER — HOSPITAL ENCOUNTER (OUTPATIENT)
Dept: WOUND CARE | Age: 77
Discharge: HOME | End: 2022-11-18
Payer: MEDICARE

## 2022-11-18 ENCOUNTER — HOSPITAL ENCOUNTER (OUTPATIENT)
Dept: OTHER | Age: 77
Setting detail: THERAPIES SERIES
Discharge: HOME | End: 2022-11-18
Payer: MEDICARE

## 2022-11-18 VITALS
WEIGHT: 208 LBS | DIASTOLIC BLOOD PRESSURE: 64 MMHG | RESPIRATION RATE: 18 BRPM | SYSTOLIC BLOOD PRESSURE: 122 MMHG | BODY MASS INDEX: 36.86 KG/M2 | HEIGHT: 63 IN | HEART RATE: 84 BPM | TEMPERATURE: 96.7 F

## 2022-11-18 VITALS
SYSTOLIC BLOOD PRESSURE: 112 MMHG | WEIGHT: 208 LBS | HEART RATE: 92 BPM | DIASTOLIC BLOOD PRESSURE: 60 MMHG | BODY MASS INDEX: 36.85 KG/M2 | RESPIRATION RATE: 18 BRPM | OXYGEN SATURATION: 98 %

## 2022-11-18 DIAGNOSIS — L97.921 SKIN ULCER OF LEFT LOWER LEG, LIMITED TO BREAKDOWN OF SKIN (HCC): Primary | ICD-10-CM

## 2022-11-18 LAB
ANION GAP SERPL CALCULATED.3IONS-SCNC: 11 MMOL/L (ref 7–16)
BUN BLDV-MCNC: 28 MG/DL (ref 6–23)
CALCIUM SERPL-MCNC: 9.6 MG/DL (ref 8.6–10.2)
CHLORIDE BLD-SCNC: 100 MMOL/L (ref 98–107)
CO2: 28 MMOL/L (ref 22–29)
CREAT SERPL-MCNC: 0.9 MG/DL (ref 0.5–1)
GFR SERPL CREATININE-BSD FRML MDRD: >60 ML/MIN/1.73
GLUCOSE BLD-MCNC: 111 MG/DL (ref 74–99)
POTASSIUM SERPL-SCNC: 3.7 MMOL/L (ref 3.5–5)
PRO-BNP: 1120 PG/ML (ref 0–450)
SODIUM BLD-SCNC: 139 MMOL/L (ref 132–146)

## 2022-11-18 PROCEDURE — 80048 BASIC METABOLIC PNL TOTAL CA: CPT

## 2022-11-18 PROCEDURE — 99203 OFFICE O/P NEW LOW 30 MIN: CPT | Performed by: FAMILY MEDICINE

## 2022-11-18 PROCEDURE — 83880 ASSAY OF NATRIURETIC PEPTIDE: CPT

## 2022-11-18 PROCEDURE — 99213 OFFICE O/P EST LOW 20 MIN: CPT

## 2022-11-18 PROCEDURE — 36415 COLL VENOUS BLD VENIPUNCTURE: CPT

## 2022-11-18 PROCEDURE — 99214 OFFICE O/P EST MOD 30 MIN: CPT

## 2022-11-18 RX ORDER — LIDOCAINE HYDROCHLORIDE 20 MG/ML
JELLY TOPICAL ONCE
Status: CANCELLED | OUTPATIENT
Start: 2022-11-18 | End: 2022-11-18

## 2022-11-18 RX ORDER — LIDOCAINE HYDROCHLORIDE 40 MG/ML
SOLUTION TOPICAL ONCE
OUTPATIENT
Start: 2022-11-18 | End: 2022-11-18

## 2022-11-18 RX ORDER — BACITRACIN, NEOMYCIN, POLYMYXIN B 400; 3.5; 5 [USP'U]/G; MG/G; [USP'U]/G
OINTMENT TOPICAL ONCE
OUTPATIENT
Start: 2022-11-18 | End: 2022-11-18

## 2022-11-18 RX ORDER — GENTAMICIN SULFATE 1 MG/G
OINTMENT TOPICAL ONCE
OUTPATIENT
Start: 2022-11-18 | End: 2022-11-18

## 2022-11-18 RX ORDER — BACITRACIN ZINC AND POLYMYXIN B SULFATE 500; 1000 [USP'U]/G; [USP'U]/G
OINTMENT TOPICAL ONCE
OUTPATIENT
Start: 2022-11-18 | End: 2022-11-18

## 2022-11-18 RX ORDER — LIDOCAINE 50 MG/G
OINTMENT TOPICAL ONCE
Status: CANCELLED | OUTPATIENT
Start: 2022-11-18 | End: 2022-11-18

## 2022-11-18 RX ORDER — GENTAMICIN SULFATE 1 MG/G
OINTMENT TOPICAL ONCE
Status: CANCELLED | OUTPATIENT
Start: 2022-11-18 | End: 2022-11-18

## 2022-11-18 RX ORDER — CLOBETASOL PROPIONATE 0.5 MG/G
OINTMENT TOPICAL ONCE
Status: CANCELLED | OUTPATIENT
Start: 2022-11-18 | End: 2022-11-18

## 2022-11-18 RX ORDER — LIDOCAINE 40 MG/G
CREAM TOPICAL ONCE
Status: CANCELLED | OUTPATIENT
Start: 2022-11-18 | End: 2022-11-18

## 2022-11-18 RX ORDER — LIDOCAINE HYDROCHLORIDE 20 MG/ML
JELLY TOPICAL ONCE
OUTPATIENT
Start: 2022-11-18 | End: 2022-11-18

## 2022-11-18 RX ORDER — BETAMETHASONE DIPROPIONATE 0.05 %
OINTMENT (GRAM) TOPICAL ONCE
OUTPATIENT
Start: 2022-11-18 | End: 2022-11-18

## 2022-11-18 RX ORDER — GINSENG 100 MG
CAPSULE ORAL ONCE
OUTPATIENT
Start: 2022-11-18 | End: 2022-11-18

## 2022-11-18 RX ORDER — LIDOCAINE 50 MG/G
OINTMENT TOPICAL ONCE
OUTPATIENT
Start: 2022-11-18 | End: 2022-11-18

## 2022-11-18 RX ORDER — BACITRACIN ZINC AND POLYMYXIN B SULFATE 500; 1000 [USP'U]/G; [USP'U]/G
OINTMENT TOPICAL ONCE
Status: CANCELLED | OUTPATIENT
Start: 2022-11-18 | End: 2022-11-18

## 2022-11-18 RX ORDER — BACITRACIN, NEOMYCIN, POLYMYXIN B 400; 3.5; 5 [USP'U]/G; MG/G; [USP'U]/G
OINTMENT TOPICAL ONCE
Status: CANCELLED | OUTPATIENT
Start: 2022-11-18 | End: 2022-11-18

## 2022-11-18 RX ORDER — BETAMETHASONE DIPROPIONATE 0.05 %
OINTMENT (GRAM) TOPICAL ONCE
Status: CANCELLED | OUTPATIENT
Start: 2022-11-18 | End: 2022-11-18

## 2022-11-18 RX ORDER — GINSENG 100 MG
CAPSULE ORAL ONCE
Status: CANCELLED | OUTPATIENT
Start: 2022-11-18 | End: 2022-11-18

## 2022-11-18 RX ORDER — CLOBETASOL PROPIONATE 0.5 MG/G
OINTMENT TOPICAL ONCE
OUTPATIENT
Start: 2022-11-18 | End: 2022-11-18

## 2022-11-18 RX ORDER — LIDOCAINE 40 MG/G
CREAM TOPICAL ONCE
OUTPATIENT
Start: 2022-11-18 | End: 2022-11-18

## 2022-11-18 RX ORDER — LIDOCAINE HYDROCHLORIDE 40 MG/ML
SOLUTION TOPICAL ONCE
Status: COMPLETED | OUTPATIENT
Start: 2022-11-18 | End: 2022-11-18

## 2022-11-18 RX ADMIN — LIDOCAINE HYDROCHLORIDE 10 ML: 40 SOLUTION TOPICAL at 12:22

## 2022-11-18 ASSESSMENT — ENCOUNTER SYMPTOMS
VOMITING: 0
DIARRHEA: 0
WHEEZING: 0
SHORTNESS OF BREATH: 0
NAUSEA: 0
ABDOMINAL PAIN: 0
COUGH: 0
CONSTIPATION: 0
BLOOD IN STOOL: 0

## 2022-11-18 NOTE — DISCHARGE INSTRUCTIONS
Visit Discharge/Physician Orders    Discharge condition: Stable    Assessment of pain at discharge: minimal    Anesthetic used: lidocaine 4%    Discharge to: Home    Left via:Private automobile    Accompanied by: accompanied by spouse    ECF/HHA:     Dressing Orders:    Left lower leg - cleanse with peroxide, apply neosporin, cover with dry dressing. In clinic: dry dressing    Treatment Orders:    FOLLOW NUTRITIOUS DIET. CHOOSE FOODS HIGH IN PROTEIN -CHICKEN- FISH-AND EGGS,  CHOOSE FOODS HIGH IN VITAMIN C.   MULTIVITAMIN DAILY. Ok to use your compression stocking. [facility] followup visit __Dr Sj Titus - 2 weeks___________________________  (Please note your next appointment above and if you are unable to keep, kindly give a 24 hour notice. Thank you.)    Physician signature:__________________________      If you experience any of the following, please call the [facility] during business hours:    * Increase in Pain  * Temperature over 101  * Increase in drainage from your wound  * Drainage with a foul odor  * Bleeding  * Increase in swelling  * Need for compression bandage changes due to slippage, breakthrough drainage. If you need medical attention outside of the business hours of the [facility] please contact your PCP or go to the nearest emergency room.

## 2022-11-18 NOTE — PROGRESS NOTES
[facility] and Physical      CHIEF COMPLAINT:    Chief Complaint   Patient presents with    Wound Check     Left leg          The Story of the Wound Trauma causing Hematoma Left pretibial and a week later developed a small scab wound. Hematoma small and soft, some color change    The patient is a 76 y.o. female patient of Mahesh Sick, DO  who presents with  a ulceration due to trauma wound which is located on the leg Current symptoms include blistering. Pain is rated 1/10.      Past Medical History:    Past Medical History:   Diagnosis Date    Asthma     Diabetes mellitus ([address]     Hyperlipidemia        Past Surgical History:    Past Surgical History:   Procedure Laterality Date    CARDIAC SURGERY      stent    CHOLECYSTECTOMY      KNEE SURGERY         Allergies:    Asa [aspirin], Pcn [penicillins], and Iodine    Labs:   CBC with Differential:    Lab Results   Component Value Date/Time    WBC 6.7 10/26/2022 09:41 AM    RBC 3.56 10/26/2022 09:41 AM    HGB 10.7 10/26/2022 09:41 AM    HCT 33.4 10/26/2022 09:41 AM     10/26/2022 09:41 AM    MCV 93.8 10/26/2022 09:41 AM    MCH 30.1 10/26/2022 09:41 AM    MCHC 32.0 10/26/2022 09:41 AM    RDW 15.6 10/26/2022 09:41 AM    LYMPHOPCT 17.9 10/26/2022 09:41 AM    MONOPCT 11.7 10/26/2022 09:41 AM    BASOPCT 0.5 10/26/2022 09:41 AM    MONOSABS 0.78 10/26/2022 09:41 AM    LYMPHSABS 1.19 10/26/2022 09:41 AM    EOSABS 0.17 10/26/2022 09:41 AM    BASOSABS 0.03 10/26/2022 09:41 AM     CMP:    Lab Results   Component Value Date/Time     11/18/2022 09:03 AM    K 3.7 11/18/2022 09:03 AM    K 3.8 10/26/2022 09:41 AM     11/18/2022 09:03 AM    CO2 28 11/18/2022 09:03 AM    BUN 28 11/18/2022 09:03 AM    CREATININE 0.9 11/18/2022 09:03 AM    GFRAA >60 03/11/2022 11:12 AM    LABGLOM >60 11/18/2022 09:03 AM    GLUCOSE 111 11/18/2022 09:03 AM    PROT 6.2 10/26/2022 09:41 AM    LABALBU 3.9 10/26/2022 09:41 AM    CALCIUM 9.6 11/18/2022 09:03 AM BILITOT 0.4 10/26/2022 09:41 AM    ALKPHOS 53 10/26/2022 09:41 AM    AST 26 10/26/2022 09:41 AM    ALT 17 10/26/2022 09:41 AM     BMP:    Lab Results   Component Value Date/Time     11/18/2022 09:03 AM    K 3.7 11/18/2022 09:03 AM    K 3.8 10/26/2022 09:41 AM     11/18/2022 09:03 AM    CO2 28 11/18/2022 09:03 AM    BUN 28 11/18/2022 09:03 AM    LABALBU 3.9 10/26/2022 09:41 AM    CREATININE 0.9 11/18/2022 09:03 AM    CALCIUM 9.6 11/18/2022 09:03 AM    GFRAA >60 03/11/2022 11:12 AM    LABGLOM >60 11/18/2022 09:03 AM    GLUCOSE 111 11/18/2022 09:03 AM     Hepatic Function Panel:    Lab Results   Component Value Date/Time    ALKPHOS 53 10/26/2022 09:41 AM    ALT 17 10/26/2022 09:41 AM    AST 26 10/26/2022 09:41 AM    PROT 6.2 10/26/2022 09:41 AM    BILITOT 0.4 10/26/2022 09:41 AM    BILIDIR <0.2 08/03/2021 09:35 AM    IBILI see below 08/03/2021 09:35 AM    LABALBU 3.9 10/26/2022 09:41 AM     Uric Acid:    Lab Results   Component Value Date/Time    LABURIC 2.5 03/11/2022 11:12 AM     PT/INR:  No results found for: PROTIME, INR  Warfarin PT/INR:  No components found for: PTPATWAR, PTINRWAR  PTT:  No results found for: APTT, PTT[APTT}  U/A:    Lab Results   Component Value Date/Time    COLORU Yellow 03/11/2022 11:12 AM    PROTEINU Negative 03/11/2022 11:12 AM    PHUR 8.0 03/11/2022 11:12 AM    WBCUA 1-3 03/11/2022 11:12 AM    RBCUA 0-1 03/11/2022 11:12 AM    BACTERIA FEW 03/11/2022 11:12 AM    CLARITYU SL CLOUDY 03/11/2022 11:12 AM    SPECGRAV 1.020 03/11/2022 11:12 AM    LEUKOCYTESUR TRACE 03/11/2022 11:12 AM    UROBILINOGEN 0.2 03/11/2022 11:12 AM    BILIRUBINUR Negative 03/11/2022 11:12 AM    BLOODU Negative 03/11/2022 11:12 AM    GLUCOSEU Negative 03/11/2022 11:12 AM     HgBA1c:  No results found for: LABA1C  Microalbumen/Creatinine ratio:  No components found for: RUCREAT      Medications Prior to Admission:    Not in a hospital admission. Social History:    reports that she has never smoked.  She has never used smokeless tobacco. She reports that she does not drink alcohol and does not use drugs. Family History:   family history includes Breast Cancer (age of onset: 62) in her mother; Colon Cancer in her sister. REVIEW OF SYSTEMS:  Review of Systems   Constitutional:  Negative for chills, diaphoresis and fever. HENT:  Negative for ear discharge, ear pain, hearing loss, nosebleeds and tinnitus. Respiratory:  Negative for cough, shortness of breath and wheezing. Cardiovascular:  Negative for chest pain. Gastrointestinal:  Negative for abdominal pain, blood in stool, constipation, diarrhea, nausea and vomiting. Genitourinary:  Negative for dysuria, flank pain and hematuria. Musculoskeletal:  Negative for myalgias. Skin:  Negative for rash. Neurological:  Negative for headaches. Hematological:  Does not bruise/bleed easily. Psychiatric/Behavioral:  Negative for hallucinations and suicidal ideas. PHYSICAL EXAM:    Vitals:  Vitals:    11/18/22 1009   BP: 122/64   Pulse: 84   Resp: 18   Temp: (!) 96.7 °F (35.9 °C)       General:  Awake, alert, oriented X 3. Well developed, well nourished, 76 y.o. female. No apparent distress. HEENT:  Normocephalic, atraumatic. Pupils equal, round, reactive to light. No scleral icterus. No conjunctival injection. Normal lips, teeth, and gums. No nasal discharge. Neck:  Supple  Lungs:  CTA bilaterally, bilat symmetrical expansion, no wheeze, rales, or rhonchi  Heart:  RRR, no murmurs, gallops, rubs  Abdomen: Bowel sounds present, soft, nontender, no masses, no organomegaly, no peritoneal signs  Extremities:  Trace edema   Skin:  Warm and dry, satisfactory turgor   Neuro:  Cranial nerves 2-12 intact, no focal deficits.     Wound Metric Flow:   /64   Pulse 84   Temp (!) 96.7 °F (35.9 °C) (Temporal)   Resp 18   Ht 5' 3\" (1.6 m)   Wt 208 lb (94.3 kg)   BMI 36.85 kg/m²   Wound dry      Wound 11/18/22 Pretibial Left #1 (Active)   Wound Image   11/18/22 1000   Dressing Status New dressing applied 11/18/22 1104   Wound Cleansed Cleansed with saline 11/18/22 1104   Dressing/Treatment Hydrating gel;Dry dressing 11/18/22 1104   Offloading for Diabetic Foot Ulcers Offloading not required 11/18/22 1104   Wound Length (cm) 0.7 cm 11/18/22 1000   Wound Width (cm) 0.8 cm 11/18/22 1000   Wound Depth (cm) 0.2 cm 11/18/22 1000   Wound Surface Area (cm^2) 0.56 cm^2 11/18/22 1000   Wound Volume (cm^3) 0.112 cm^3 11/18/22 1000   Wound Assessment Eschar dry 11/18/22 1000   Drainage Amount None 11/18/22 1000   Odor None 11/18/22 1000   Aimee-wound Assessment Ecchymosis 11/18/22 1000   Number of days: 0               Procedure:None        Active Problems:    Skin ulcer of left lower leg, limited to breakdown of skin ([address]  Resolved Problems:    * No resolved hospital problems. *       Diagnosis:           Hematoma Left leg. Traumatic ulcer Left leg. @[id number])@              Plan:  1. H&P, General medical evaluation for the purpose of Comprehensive wound management for maximum healing and minimal morbidity. 2. Advice and . 3. We had a lengthy discussion about the diagnosis and aspects  to consider.          Marisol Jack DO       11/18/2022    11:45 AM

## 2022-11-18 NOTE — PROGRESS NOTES
Congestive Heart Failure Trice 6   1945      Referring Provider: Dr. Conway Or with ED  Primary Care Physician: Dr. Mark Ramey  Cardiologist: Dr. Devante Reese    History of Present Illness:    Rachell Flynn is a 76 y.o. female with a history of type 2 diabetes, asthma, coronary artery disease, hyperlipidemia, hypertension, and CAD (follows with Dr. Devante Reese) was referred from ED physician at [address] [place] [address],[address] when patient declined admission to hospital for IV diuretic therapy on 10/26. Patient Story:  She does not have dyspnea with exertion, shortness of breath, or decline in overall functional capacity. She does not have orthopnea, PND, nocturnal cough or hemoptysis. She does not have abdominal distention or bloating, early satiety, anorexia/change in appetite. She does has a good urinary response to oral diuretic. She does have an improvement in pitting lower extremity edema. She complains of improving lightheadedness, dizziness. She denies palpitations, syncope or near syncope. She does not complain of chest pain, pressure, discomfort. Allergies   Allergen Reactions    Asa [Aspirin] Shortness Of Breath    Pcn [Penicillins] Swelling    Iodine Rash         No outpatient medications have been marked as taking for the 11/18/22 encounter [facility] Encounter) with [facility] CHF ROOM 1.      Current Outpatient Medications on File Prior to Encounter   Medication Sig Dispense Refill    cephALEXin (KEFLEX) 500 MG capsule Take 500 mg by mouth 4 times daily      bumetanide (BUMEX) 1 MG tablet Take 1 tablet by mouth daily for 14 days 14 tablet 0    pantoprazole (PROTONIX) 40 MG tablet Take 40 mg by mouth daily      pioglitazone (ACTOS) 30 MG tablet Take 30 mg by mouth daily      clopidogrel (PLAVIX) 75 MG tablet Take 75 mg by mouth daily      atorvastatin (LIPITOR) 10 MG tablet Take 10 mg by mouth daily      atenolol (TENORMIN) 25 MG tablet Take 25 mg by mouth daily      budesonide-formoterol (SYMBICORT) 160-4.5 MCG/ACT AERO Inhale 2 puffs into the lungs 2 times daily      albuterol sulfate  (90 Base) MCG/ACT inhaler Inhale 2 puffs into the lungs every 6 hours as needed for Wheezing      azelastine (ASTELIN) 0.1 % nasal spray 1 spray by Nasal route 2 times daily Use in each nostril as directed      alendronate (FOSAMAX) 70 MG tablet Take 70 mg by mouth every 7 days      lisinopril (PRINIVIL;ZESTRIL) 10 MG tablet Take 10 mg by mouth daily. Multiple Vitamins-Minerals (CENTRUM SILVER PO) Take  by mouth daily. Misc Natural Products (OSTEO BI-FLEX ADV JOINT SHIELD PO) Take  by mouth. Calcium Carbonate-Vitamin D (CALTRATE 600+D PO) Take  by mouth daily. metformin (GLUCOPHAGE) 1000 MG tablet Take 1,000 mg by mouth 2 times daily (with meals). Montelukast Sodium (SINGULAIR PO) Take  by mouth.      meloxicam (MOBIC) 15 MG tablet Take 15 mg by mouth daily. omeprazole (PRILOSEC) 20 MG capsule Take 20 mg by mouth daily. Tiotropium Bromide Monohydrate ([place] HANDIHALER IN) Inhale  into the lungs. No current facility-administered medications on file prior to encounter. Guideline directed medical:  ARNI/ACE I/ARB: Yes  Beta blocker:   Yes  Aldosterone antagonist:  No  SGLT2i:  No        Physical Examination:     /60   Pulse 92   Resp 18   Wt 208 lb (94.3 kg)   SpO2 98%   BMI 36.85 kg/m²     Assessment  Charting Type: Shift assessment    Neurological  Level of Consciousness: Alert (0)          Respiratory  Respiratory Pattern: Regular  Respiratory Depth: Normal  Respiratory Quality/Effort: Unlabored, Dyspnea with exertion  Chest Assessment: Chest expansion symmetrical  L Breath Sounds: Clear, Bilateral  R Breath Sounds: Clear, Bilateral          Cardiac  Cardiac Regularity: Regular  Heart Sounds: S1, S2  Cardiac Rhythm: Sinus rhythm    Rhythm Interpretation  Heart Rate: 92         Gastrointestinal  Abdominal (WDL): Exceptions to WDL  Abdomen Inspection: Soft               Peripheral Vascular  Peripheral Vascular (WDL): Exceptions to WDL  RLE Edema: +1, Pitting  LLE Edema: +1, Pitting              Musculoskeletal  RL Extremity: Weakness, Swelling  LL Extremity: Weakness, Swelling         Psychosocial  Psychosocial (WDL): Within Defined Limits                        Heart Rate: 92                     LAB DATA:    Last 3 BMP      Sodium (mmol/L)   Date Value   11/18/2022 139   11/08/2022 139   10/26/2022 138     Potassium (mmol/L)   Date Value   11/18/2022 3.7   11/08/2022 3.9   03/11/2022 4.4     Potassium reflex Magnesium (mmol/L)   Date Value   10/26/2022 3.8     Chloride (mmol/L)   Date Value   11/18/2022 100   11/08/2022 102   10/26/2022 102     CO2 (mmol/L)   Date Value   11/18/2022 28   11/08/2022 24   10/26/2022 27     BUN (mg/dL)   Date Value   11/18/2022 28 (H)   11/08/2022 22   10/26/2022 29 (H)     Glucose (mg/dL)   Date Value   11/18/2022 111 (H)   11/08/2022 93   10/26/2022 120 (H)     Calcium (mg/dL)   Date Value   11/18/2022 9.6   11/08/2022 9.2   10/26/2022 9.5       Last 3 BNP       Pro-BNP (pg/mL)   Date Value   11/18/2022 1,120 (H)   11/08/2022 1,634 (H)   10/26/2022 1,799 (H)          CBC: No results for input(s): WBC, HGB, PLT in the last 72 hours. BMP:    Recent Labs     11/18/22  0903      K 3.7      CO2 28   BUN 28*   CREATININE 0.9   GLUCOSE 111*     Hepatic: No results for input(s): AST, ALT, ALB, BILITOT, ALKPHOS in the last 72 hours. Troponin: No results for input(s): TROPONINI in the last 72 hours. BNP: No results for input(s): BNP in the last 72 hours. Lipids: No results for input(s): CHOL, HDL in the last 72 hours. Invalid input(s): LDLCALCU  INR: No results for input(s): INR in the last 72 hours.         WEIGHTS:    Wt Readings from Last 3 Encounters:   11/18/22 208 lb (94.3 kg)   11/18/22 208 lb (94.3 kg)   11/08/22 213 lb (96.6 kg)         TELEMETRY:  Cardiac Regularity: Regular  Cardiac Rhythm/Interpretation: SR        ASSESSMENT:  Jenniffer Cruz presents for CHF clinic follow up visit with a 5 lb weight loss since receiving IVP diuretic last week. Patient presented to appointment with weight log and sodium tracker sheet. Patient reports observing sodium labels making healthier food choices. On assessment lungs are clear, abdomen is soft, and there is a decrease in pitting edema in BLE. Follow up made for 2 weeks. Interventions completed this visit:  IV diuretics given: NO  Lab work obtained: yes, pro-BNP, BMP  Reviewed currently prescribed medications with patient, educated on importance of compliance and answered any questions regarding their medication  Educated on signs and symptoms of HF  Educated on low sodium diet    PLAN:  Scheduled to follow up in CHF clinic on   Future Appointments   Date Time Provider Katherin Rogers   12/2/2022  9:30 AM [facility] CHF ROOM [address]     Given clinic phone number and aware of signs and symptoms to call with any HF change in symptoms.

## 2022-12-02 ENCOUNTER — HOSPITAL ENCOUNTER (OUTPATIENT)
Dept: OTHER | Age: 77
Setting detail: THERAPIES SERIES
Discharge: HOME | End: 2022-12-02
Payer: MEDICARE

## 2022-12-02 ENCOUNTER — HOSPITAL ENCOUNTER (OUTPATIENT)
Dept: WOUND CARE | Age: 77
Discharge: HOME | End: 2022-12-02
Payer: MEDICARE

## 2022-12-02 VITALS
HEART RATE: 73 BPM | RESPIRATION RATE: 16 BRPM | BODY MASS INDEX: 36.31 KG/M2 | OXYGEN SATURATION: 98 % | WEIGHT: 205 LBS

## 2022-12-02 DIAGNOSIS — L97.921 SKIN ULCER OF LEFT LOWER LEG, LIMITED TO BREAKDOWN OF SKIN (HCC): Primary | ICD-10-CM

## 2022-12-02 LAB
ANION GAP SERPL CALCULATED.3IONS-SCNC: 9 MMOL/L (ref 7–16)
BUN BLDV-MCNC: 39 MG/DL (ref 6–23)
CALCIUM SERPL-MCNC: 9.4 MG/DL (ref 8.6–10.2)
CHLORIDE BLD-SCNC: 101 MMOL/L (ref 98–107)
CO2: 30 MMOL/L (ref 22–29)
CREAT SERPL-MCNC: 0.8 MG/DL (ref 0.5–1)
GFR SERPL CREATININE-BSD FRML MDRD: >60 ML/MIN/1.73
GLUCOSE BLD-MCNC: 101 MG/DL (ref 74–99)
POTASSIUM SERPL-SCNC: 3.8 MMOL/L (ref 3.5–5)
PRO-BNP: 1096 PG/ML (ref 0–450)
SODIUM BLD-SCNC: 140 MMOL/L (ref 132–146)

## 2022-12-02 PROCEDURE — 80048 BASIC METABOLIC PNL TOTAL CA: CPT

## 2022-12-02 PROCEDURE — 99213 OFFICE O/P EST LOW 20 MIN: CPT | Performed by: FAMILY MEDICINE

## 2022-12-02 PROCEDURE — 83880 ASSAY OF NATRIURETIC PEPTIDE: CPT

## 2022-12-02 PROCEDURE — 99212 OFFICE O/P EST SF 10 MIN: CPT

## 2022-12-02 PROCEDURE — 36415 COLL VENOUS BLD VENIPUNCTURE: CPT

## 2022-12-02 PROCEDURE — 99214 OFFICE O/P EST MOD 30 MIN: CPT

## 2022-12-02 RX ORDER — GINSENG 100 MG
CAPSULE ORAL ONCE
Status: CANCELLED | OUTPATIENT
Start: 2022-12-02 | End: 2022-12-02

## 2022-12-02 RX ORDER — LIDOCAINE 40 MG/G
CREAM TOPICAL ONCE
Status: CANCELLED | OUTPATIENT
Start: 2022-12-02 | End: 2022-12-02

## 2022-12-02 RX ORDER — BACITRACIN ZINC AND POLYMYXIN B SULFATE 500; 1000 [USP'U]/G; [USP'U]/G
OINTMENT TOPICAL ONCE
Status: CANCELLED | OUTPATIENT
Start: 2022-12-02 | End: 2022-12-02

## 2022-12-02 RX ORDER — GENTAMICIN SULFATE 1 MG/G
OINTMENT TOPICAL ONCE
Status: CANCELLED | OUTPATIENT
Start: 2022-12-02 | End: 2022-12-02

## 2022-12-02 RX ORDER — CLOBETASOL PROPIONATE 0.5 MG/G
OINTMENT TOPICAL ONCE
Status: CANCELLED | OUTPATIENT
Start: 2022-12-02 | End: 2022-12-02

## 2022-12-02 RX ORDER — LIDOCAINE HYDROCHLORIDE 20 MG/ML
JELLY TOPICAL ONCE
Status: CANCELLED | OUTPATIENT
Start: 2022-12-02 | End: 2022-12-02

## 2022-12-02 RX ORDER — LIDOCAINE HYDROCHLORIDE 40 MG/ML
SOLUTION TOPICAL ONCE
Status: CANCELLED | OUTPATIENT
Start: 2022-12-02 | End: 2022-12-02

## 2022-12-02 RX ORDER — LIDOCAINE 50 MG/G
OINTMENT TOPICAL ONCE
Status: CANCELLED | OUTPATIENT
Start: 2022-12-02 | End: 2022-12-02

## 2022-12-02 RX ORDER — LIDOCAINE HYDROCHLORIDE 40 MG/ML
SOLUTION TOPICAL ONCE
Status: COMPLETED | OUTPATIENT
Start: 2022-12-02 | End: 2022-12-02

## 2022-12-02 RX ORDER — BACITRACIN, NEOMYCIN, POLYMYXIN B 400; 3.5; 5 [USP'U]/G; MG/G; [USP'U]/G
OINTMENT TOPICAL ONCE
Status: CANCELLED | OUTPATIENT
Start: 2022-12-02 | End: 2022-12-02

## 2022-12-02 RX ORDER — BETAMETHASONE DIPROPIONATE 0.05 %
OINTMENT (GRAM) TOPICAL ONCE
Status: CANCELLED | OUTPATIENT
Start: 2022-12-02 | End: 2022-12-02

## 2022-12-02 RX ADMIN — LIDOCAINE HYDROCHLORIDE 10 ML: 40 SOLUTION TOPICAL at 10:57

## 2022-12-02 NOTE — PROGRESS NOTES
Congestive Heart Failure Trice 6   1945      Referring Provider: Dr. Shawn Muniz with ED  Primary Care Physician: Dr. Pricilla Maldonado  Cardiologist: Dr. Israel Herron    History of Present Illness:    Tim Holloway is a 76 y.o. female with a history of type 2 diabetes, asthma, coronary artery disease, hyperlipidemia, hypertension, and CAD (follows with Dr. Israel Herron) was referred from ED physician at [address] [place] [address],[address] when patient declined admission to hospital for IV diuretic therapy on 10/26. Patient Story:  She does not have dyspnea with exertion, shortness of breath, or decline in overall functional capacity. She does not have orthopnea, PND, nocturnal cough or hemoptysis. She does not have abdominal distention or bloating, early satiety, anorexia/change in appetite. She does has a good urinary response to oral diuretic. She does have an improvement in pitting lower extremity edema. She complains of improving lightheadedness, dizziness. She denies palpitations, syncope or near syncope. She does not complain of chest pain, pressure, discomfort. Allergies   Allergen Reactions    Asa [Aspirin] Shortness Of Breath    Pcn [Penicillins] Swelling    Iodine Rash         No outpatient medications have been marked as taking for the 12/2/22 encounter [facility] Encounter) with [facility] CHF ROOM 1.      Current Outpatient Medications on File Prior to Encounter   Medication Sig Dispense Refill    cephALEXin (KEFLEX) 500 MG capsule Take 500 mg by mouth 4 times daily      bumetanide (BUMEX) 1 MG tablet Take 1 tablet by mouth daily for 14 days 14 tablet 0    pantoprazole (PROTONIX) 40 MG tablet Take 40 mg by mouth daily (Patient not taking: Reported on 12/2/2022)      pioglitazone (ACTOS) 30 MG tablet Take 45 mg by mouth daily      clopidogrel (PLAVIX) 75 MG tablet Take 75 mg by mouth daily      atorvastatin (LIPITOR) 10 MG tablet Take 40 mg by mouth daily      atenolol (TENORMIN) 25 MG tablet Take 25 mg by mouth daily      budesonide-formoterol (SYMBICORT) 160-4.5 MCG/ACT AERO Inhale 2 puffs into the lungs 2 times daily      albuterol sulfate  (90 Base) MCG/ACT inhaler Inhale 2 puffs into the lungs every 6 hours as needed for Wheezing      azelastine (ASTELIN) 0.1 % nasal spray 1 spray by Nasal route 2 times daily Use in each nostril as directed      alendronate (FOSAMAX) 70 MG tablet Take 70 mg by mouth every 7 days      lisinopril (PRINIVIL;ZESTRIL) 10 MG tablet Take 10 mg by mouth daily. Multiple Vitamins-Minerals (CENTRUM SILVER PO) Take  by mouth daily. Misc Natural Products (OSTEO BI-FLEX ADV JOINT SHIELD PO) Take  by mouth. Calcium Carbonate-Vitamin D (CALTRATE 600+D PO) Take  by mouth daily. metformin (GLUCOPHAGE) 1000 MG tablet Take 1,000 mg by mouth 2 times daily (with meals). Montelukast Sodium (SINGULAIR PO) Take  by mouth.      meloxicam (MOBIC) 15 MG tablet Take 15 mg by mouth daily. omeprazole (PRILOSEC) 20 MG capsule Take 20 mg by mouth daily. (Patient not taking: Reported on 12/2/2022)      Tiotropium Bromide Monohydrate ([place] IN) Inhale  into the lungs. No current facility-administered medications on file prior to encounter. Guideline directed medical:  ARNI/ACE I/ARB: Yes  Beta blocker:   Yes  Aldosterone antagonist:  No  SGLT2i:  No        Physical Examination:     Pulse 73   Resp 16   Wt 205 lb (93 kg)   SpO2 98%   BMI 36.31 kg/m²     Assessment  Charting Type: Shift assessment (chf clinic)               Respiratory  Respiratory Pattern: Regular  Respiratory Depth: Normal  Respiratory Quality/Effort: Unlabored  Chest Assessment: Chest expansion symmetrical  L Breath Sounds: Clear, Bilateral  R Breath Sounds: Clear, Bilateral          Cardiac  Cardiac Regularity: Regular  Heart Sounds: S1, S2  Cardiac Rhythm: Sinus rhythm    Rhythm Interpretation  Heart Rate: 73 Gastrointestinal  Abdominal (WDL): Within Defined Limits               Peripheral Vascular  Peripheral Vascular (WDL): Exceptions to WDL  RLE Edema: Trace  LLE Edema: Trace                   Genitourinary  Genitourinary (WDL): Within Defined Limits    Psychosocial  Psychosocial (WDL): Within Defined Limits                        Heart Rate: 73                     LAB DATA:    Last 3 BMP      Sodium (mmol/L)   Date Value   12/02/2022 140   11/18/2022 139   11/08/2022 139     Potassium (mmol/L)   Date Value   12/02/2022 3.8   11/18/2022 3.7   11/08/2022 3.9     Potassium reflex Magnesium (mmol/L)   Date Value   10/26/2022 3.8     Chloride (mmol/L)   Date Value   12/02/2022 101   11/18/2022 100   11/08/2022 102     CO2 (mmol/L)   Date Value   12/02/2022 30 (H)   11/18/2022 28   11/08/2022 24     BUN (mg/dL)   Date Value   12/02/2022 39 (H)   11/18/2022 28 (H)   11/08/2022 22     Glucose (mg/dL)   Date Value   12/02/2022 101 (H)   11/18/2022 111 (H)   11/08/2022 93     Calcium (mg/dL)   Date Value   12/02/2022 9.4   11/18/2022 9.6   11/08/2022 9.2       Last 3 BNP       Pro-BNP (pg/mL)   Date Value   12/02/2022 1,096 (H)   11/18/2022 1,120 (H)   11/08/2022 1,634 (H)          CBC: No results for input(s): WBC, HGB, PLT in the last 72 hours. BMP:    Recent Labs     12/02/22  0956      K 3.8      CO2 30*   BUN 39*   CREATININE 0.8   GLUCOSE 101*       Hepatic: No results for input(s): AST, ALT, ALB, BILITOT, ALKPHOS in the last 72 hours. Troponin: No results for input(s): TROPONINI in the last 72 hours. BNP: No results for input(s): BNP in the last 72 hours. Lipids: No results for input(s): CHOL, HDL in the last 72 hours. Invalid input(s): LDLCALCU  INR: No results for input(s): INR in the last 72 hours.         WEIGHTS:    Wt Readings from Last 3 Encounters:   12/02/22 205 lb (93 kg)   11/18/22 208 lb (94.3 kg)   11/18/22 208 lb (94.3 kg)         TELEMETRY:  Cardiac Regularity: Regular  Cardiac Rhythm/Interpretation: SR        ASSESSMENT:  Rachell Flynn presents for CHF clinic follow up visit with an additional 3 lb weight loss. Patient denies SOB, NIETO, or orthopnea. Patient provided home BP log- forwarded information to Dr. Devante Reese for review along with today's labs. Follow up made for 1 month. Interventions completed this visit:  IV diuretics given: NO  Lab work obtained: yes, pro-BNP, BMP  Reviewed currently prescribed medications with patient, educated on importance of compliance and answered any questions regarding their medication  Educated on signs and symptoms of HF  Educated on low sodium diet    PLAN:  Scheduled to follow up in CHF clinic on   Future Appointments   Date Time Provider Katherin Rogers   1/6/2023 10:30 AM [facility] CHF ROOM [address]     Given clinic phone number and aware of signs and symptoms to call with any HF change in symptoms.

## 2022-12-02 NOTE — PLAN OF CARE
Problem: Chronic Conditions and Co-morbidities  Goal: Patient's chronic conditions and co-morbidity symptoms are monitored and maintained or improved  Outcome: Completed     Problem: Wound:  Goal: Will show signs of wound healing; wound closure and no evidence of infection  Description: Will show signs of wound healing; wound closure and no evidence of infection  12/2/2022 1149 by Beatriz Marquez RN  Outcome: Completed  12/2/2022 1037 by Taylor Wilder RN  Outcome: Progressing     Problem: Pain  Goal: Verbalizes/displays adequate comfort level or baseline comfort level  12/2/2022 1149 by Beatriz Marquez RN  Outcome: Completed  12/2/2022 1037 by Taylor Wilder RN  Outcome: Progressing

## 2022-12-02 NOTE — DISCHARGE INSTRUCTIONS
Visit Discharge/Physician Orders     Discharge condition: Stable     Assessment of pain at discharge: minimal     Anesthetic used: lidocaine 4%     Discharge to: Home     Left via:Private automobile     Accompanied by: accompanied by spouse     ECF/HHA:      Dressing Orders:     Left lower leg - healed, allow scab to flake off     In clinic: dry dressing     Treatment Orders:     FOLLOW NUTRITIOUS DIET. CHOOSE FOODS HIGH IN PROTEIN -CHICKEN- FISH-AND EGGS,  CHOOSE FOODS HIGH IN VITAMIN C.   MULTIVITAMIN DAILY. Ok to use your compression stocking. [facility] followup visit __Dr Filiberto Bashir - discharge___________________________  (Please note your next appointment above and if you are unable to keep, kindly give a 24 hour notice. Thank you.)     Physician signature:__________________________        If you experience any of the following, please call the [facility] during business hours:     * Increase in Pain  * Temperature over 101  * Increase in drainage from your wound  * Drainage with a foul odor  * Bleeding  * Increase in swelling  * Need for compression bandage changes due to slippage, breakthrough drainage. If you need medical attention outside of the business hours of the [facility] please contact your PCP or go to the nearest emergency room.

## 2022-12-02 NOTE — PLAN OF CARE
Problem: Chronic Conditions and Co-morbidities  Goal: Patient's chronic conditions and co-morbidity symptoms are monitored and maintained or improved  Outcome: Progressing  Flowsheets (Taken 12/2/2022 0502)  Care Plan - Patient's Chronic Conditions and Co-Morbidity Symptoms are Monitored and Maintained or Improved: Monitor and assess patient's chronic conditions and comorbid symptoms for stability, deterioration, or improvement

## 2022-12-02 NOTE — PROGRESS NOTES
Wound Care Discharge Summary    Nancy Cardoza  76 y.o.   1945 female   12/2/2022 12/2/22  Patient Active Problem List   Diagnosis    Back pain    Skin ulcer of left lower leg, limited to breakdown of skin ([address]       Final Wound Metrics:    Flow There were no vitals taken for this visit. Wound is Healed                                           Wound Reference Date is when first assessed. Measurements shown are from today's visit. Reason for Admission:  Principal Diagnosis:pressure ulcer: Stage III  Secondary Diagnosis: BAck pain  Procedures: Multiple serial debridements are recorded and these were performed at a time and in a manner that was deemed necessary over the course of therapy for the included conditions. Brief Summary of Patient's Course: Following initial Wound evaluation a comprehensive plan was formulated by the team to include weekly evaluation and debridement, nutritinal support, Glycemic control, infectious prevention, moisture balance, vascular competence and avoidance of trauma. Patient Condition: Stable      Patient Active Problem List   Diagnosis Code    Back pain M54.9    Skin ulcer of left lower leg, limited to breakdown of skin ([address] L97.921       Activity Limitations:  No restriction. Diet:  low sodium    Follow Up : To PCP's office in 3 weeks.      Chalo Ellis D.O.      12/2/22 12:00 PM

## 2023-01-06 ENCOUNTER — HOSPITAL ENCOUNTER (OUTPATIENT)
Dept: OTHER | Age: 78
Setting detail: THERAPIES SERIES
Discharge: HOME | End: 2023-01-06
Payer: MEDICARE

## 2023-01-06 VITALS
SYSTOLIC BLOOD PRESSURE: 102 MMHG | HEART RATE: 73 BPM | OXYGEN SATURATION: 96 % | WEIGHT: 204 LBS | DIASTOLIC BLOOD PRESSURE: 52 MMHG | RESPIRATION RATE: 18 BRPM | BODY MASS INDEX: 36.14 KG/M2

## 2023-01-06 LAB
ANION GAP SERPL CALCULATED.3IONS-SCNC: 10 MMOL/L (ref 7–16)
BUN BLDV-MCNC: 31 MG/DL (ref 6–23)
CALCIUM SERPL-MCNC: 9.5 MG/DL (ref 8.6–10.2)
CHLORIDE BLD-SCNC: 101 MMOL/L (ref 98–107)
CO2: 24 MMOL/L (ref 22–29)
CREAT SERPL-MCNC: 0.8 MG/DL (ref 0.5–1)
GFR SERPL CREATININE-BSD FRML MDRD: >60 ML/MIN/1.73
GLUCOSE BLD-MCNC: 98 MG/DL (ref 74–99)
POTASSIUM SERPL-SCNC: 5.1 MMOL/L (ref 3.5–5)
PRO-BNP: 1304 PG/ML (ref 0–450)
SODIUM BLD-SCNC: 135 MMOL/L (ref 132–146)

## 2023-01-06 PROCEDURE — 80048 BASIC METABOLIC PNL TOTAL CA: CPT

## 2023-01-06 PROCEDURE — 2580000003 HC RX 258: Performed by: INTERNAL MEDICINE

## 2023-01-06 PROCEDURE — 83880 ASSAY OF NATRIURETIC PEPTIDE: CPT

## 2023-01-06 PROCEDURE — 99214 OFFICE O/P EST MOD 30 MIN: CPT

## 2023-01-06 PROCEDURE — 2500000003 HC RX 250 WO HCPCS: Performed by: INTERNAL MEDICINE

## 2023-01-06 PROCEDURE — 36415 COLL VENOUS BLD VENIPUNCTURE: CPT

## 2023-01-06 PROCEDURE — 96374 THER/PROPH/DIAG INJ IV PUSH: CPT

## 2023-01-06 RX ORDER — SODIUM CHLORIDE 0.9 % (FLUSH) 0.9 %
5-40 SYRINGE (ML) INJECTION ONCE
Status: COMPLETED | OUTPATIENT
Start: 2023-01-06 | End: 2023-01-06

## 2023-01-06 RX ORDER — BUMETANIDE 0.25 MG/ML
2 INJECTION, SOLUTION INTRAMUSCULAR; INTRAVENOUS ONCE
Status: DISCONTINUED | OUTPATIENT
Start: 2023-01-06 | End: 2023-01-06 | Stop reason: ALTCHOICE

## 2023-01-06 RX ADMIN — BUMETANIDE 2 MG: 0.25 INJECTION INTRAMUSCULAR; INTRAVENOUS at 11:27

## 2023-01-06 RX ADMIN — SODIUM CHLORIDE, PRESERVATIVE FREE 10 ML: 5 INJECTION INTRAVENOUS at 11:27

## 2023-01-06 NOTE — PROGRESS NOTES
12:48 PM 1/6/2023 Faxed labs and note for review for Dr. Robb Wilder. 12:51 PM 1/6/2023 Called Dr. Amparo Gold office re: abnormal labs and to be aware that note, vitals and labs are being sent over for review.

## 2023-01-06 NOTE — PROGRESS NOTES
Congestive Heart Failure [address]   1945      Referring Provider: Dr. Shahbaz Euceda with ED  Primary Care Physician: Dr. Lily Mauricio  Cardiologist: Dr. Robb Wilder  Nephrologist: BEREKET    History of Present Illness:    Aruna Drummond is a 76 y.o. female with a history of type 2 diabetes, asthma, coronary artery disease, hyperlipidemia, hypertension, and CAD (follows with Dr. Robb Wilder) was referred from ED physician at [address] [place] [address],[address] when patient declined admission to hospital for IV diuretic therapy on 10/26. Patient Story:  She does have dyspnea with exertion, shortness of breath, or decline in overall functional capacity. She does not have orthopnea, PND, nocturnal cough or hemoptysis. She does have abdominal distention or bloating, early satiety, anorexia/change in appetite. She does has a good urinary response to oral diuretic. She does have +1 pitting lower extremity edema. She does not complain of lightheadedness, dizziness. She denies palpitations, syncope or near syncope. She does not complain of chest pain, pressure, discomfort. Allergies   Allergen Reactions    Asa [Aspirin] Shortness Of Breath    Pcn [Penicillins] Swelling    Iodine Rash         No outpatient medications have been marked as taking for the 1/6/23 encounter [facility] Encounter) with [facility] CHF ROOM 1.      Current Outpatient Medications on File Prior to Encounter   Medication Sig Dispense Refill    bumetanide (BUMEX) 1 MG tablet Take 1 tablet by mouth daily for 14 days (Patient not taking: Reported on 1/6/2023) 14 tablet 0    pantoprazole (PROTONIX) 40 MG tablet Take 40 mg by mouth daily (Patient not taking: No sig reported)      pioglitazone (ACTOS) 30 MG tablet Take 45 mg by mouth daily      clopidogrel (PLAVIX) 75 MG tablet Take 75 mg by mouth daily      atorvastatin (LIPITOR) 10 MG tablet Take 40 mg by mouth daily      atenolol (TENORMIN) 25 MG tablet Take 25 mg by mouth daily budesonide-formoterol (SYMBICORT) 160-4.5 MCG/ACT AERO Inhale 2 puffs into the lungs 2 times daily      albuterol sulfate  (90 Base) MCG/ACT inhaler Inhale 2 puffs into the lungs every 6 hours as needed for Wheezing      azelastine (ASTELIN) 0.1 % nasal spray 1 spray by Nasal route 2 times daily Use in each nostril as directed      alendronate (FOSAMAX) 70 MG tablet Take 70 mg by mouth every 7 days      lisinopril (PRINIVIL;ZESTRIL) 10 MG tablet Take 10 mg by mouth daily. Multiple Vitamins-Minerals (CENTRUM SILVER PO) Take  by mouth daily. Misc Natural Products (OSTEO BI-FLEX ADV JOINT SHIELD PO) Take  by mouth. Calcium Carbonate-Vitamin D (CALTRATE 600+D PO) Take  by mouth daily. metformin (GLUCOPHAGE) 1000 MG tablet Take 1,000 mg by mouth 2 times daily (with meals). Montelukast Sodium (SINGULAIR PO) Take 10 mg by mouth      meloxicam (MOBIC) 15 MG tablet Take 15 mg by mouth daily. omeprazole (PRILOSEC) 20 MG capsule Take 20 mg by mouth daily. (Patient not taking: No sig reported)      Tiotropium Bromide Monohydrate (SPIRIVA HANDIHALER IN) Inhale 18 mcg into the lungs       No current facility-administered medications on file prior to encounter. Guideline directed medical:  ARNI/ACE I/ARB: Yes  Beta blocker:   Yes  Aldosterone antagonist:  No  SGLT2i:  No    Physical Examination:     BP (!) 102/52   Pulse 73   Resp 18   Wt 204 lb (92.5 kg)   SpO2 96%   BMI 36.14 kg/m²     Assessment  Charting Type: Shift assessment (chf clinic)    Neurological  Level of Consciousness: Alert (0)    Respiratory  Respiratory Pattern: Regular  Respiratory Depth: Normal  Respiratory Quality/Effort: Unlabored  Chest Assessment: Chest expansion symmetrical  L Breath Sounds: Diminished  R Breath Sounds: Diminished      Cardiac  Cardiac Regularity: Regular  Heart Sounds: S1, S2  Cardiac Rhythm: Sinus rhythm    Rhythm Interpretation  Heart Rate: 73    Gastrointestinal  Abdominal (WDL): Within Defined Limits     Peripheral Vascular  Peripheral Vascular (WDL): Exceptions to WDL  RLE Edema: Pitting, +1  LLE Edema: +1, Pitting    Genitourinary  Genitourinary (WDL): Within Defined Limits    Psychosocial  Psychosocial (WDL): Within Defined Limits    Heart Rate: 73      LAB DATA:    Last 3 BMP      Sodium (mmol/L)   Date Value   01/06/2023 135   12/02/2022 140   11/18/2022 139     Potassium (mmol/L)   Date Value   01/06/2023 5.1 (H)   12/02/2022 3.8   11/18/2022 3.7     Potassium reflex Magnesium (mmol/L)   Date Value   10/26/2022 3.8     Chloride (mmol/L)   Date Value   01/06/2023 101   12/02/2022 101   11/18/2022 100     CO2 (mmol/L)   Date Value   01/06/2023 24   12/02/2022 30 (H)   11/18/2022 28     BUN (mg/dL)   Date Value   01/06/2023 31 (H)   12/02/2022 39 (H)   11/18/2022 28 (H)     Glucose (mg/dL)   Date Value   01/06/2023 98   12/02/2022 101 (H)   11/18/2022 111 (H)     Calcium (mg/dL)   Date Value   01/06/2023 9.5   12/02/2022 9.4   11/18/2022 9.6       Last 3 BNP       Pro-BNP (pg/mL)   Date Value   01/06/2023 1,304 (H)   12/02/2022 1,096 (H)   11/18/2022 1,120 (H)          CBC: No results for input(s): WBC, HGB, PLT in the last 72 hours. BMP:    Recent Labs     01/06/23  1125      K 5.1*      CO2 24   BUN 31*   CREATININE 0.8   GLUCOSE 98         Hepatic: No results for input(s): AST, ALT, ALB, BILITOT, ALKPHOS in the last 72 hours. Troponin: No results for input(s): TROPONINI in the last 72 hours. BNP: No results for input(s): BNP in the last 72 hours. Lipids: No results for input(s): CHOL, HDL in the last 72 hours. Invalid input(s): LDLCALCU  INR: No results for input(s): INR in the last 72 hours. WEIGHTS:    Wt Readings from Last 3 Encounters:   01/06/23 204 lb (92.5 kg)   12/02/22 205 lb (93 kg)   11/18/22 208 lb (94.3 kg)     TELEMETRY:  Cardiac Regularity: Regular  Cardiac Rhythm/Interpretation: SR    ASSESSMENT:  Aruna Drummond presents for CHF clinic follow up visit. Pt has not taken her bumex for 4 days because she thought she did not need it anymore. Upon assessment bilateral lungs are diminished, abdomen bloating (distended), BLE  +1 pitting edema.  + SOB and NIETO. Follow up made for 1 week. Interventions completed this visit:  IV diuretics given: IV bumex 2mg x1  Lab work obtained: yes, pro-BNP, BMP  Reviewed currently prescribed medications with patient, educated on importance of compliance and answered any questions regarding their medication  Educated on signs and symptoms of HF  Educated on low sodium diet    PLAN:  Scheduled to follow up in CHF clinic on   Future Appointments   Date Time Provider Katherin Rogers   1/12/2023  8:30 AM [facility] CHF ROOM [address]     Given clinic phone number and aware of signs and symptoms to call with any HF change in symptoms.

## 2023-01-12 ENCOUNTER — HOSPITAL ENCOUNTER (OUTPATIENT)
Dept: OTHER | Age: 78
Setting detail: THERAPIES SERIES
Discharge: HOME | End: 2023-01-12
Payer: MEDICARE

## 2023-01-12 VITALS
WEIGHT: 203.04 LBS | DIASTOLIC BLOOD PRESSURE: 60 MMHG | BODY MASS INDEX: 35.98 KG/M2 | HEART RATE: 70 BPM | HEIGHT: 63 IN | SYSTOLIC BLOOD PRESSURE: 102 MMHG | RESPIRATION RATE: 18 BRPM | OXYGEN SATURATION: 98 %

## 2023-01-12 LAB
ANION GAP SERPL CALCULATED.3IONS-SCNC: 9 MMOL/L (ref 7–16)
BUN BLDV-MCNC: 33 MG/DL (ref 6–23)
CALCIUM SERPL-MCNC: 9.3 MG/DL (ref 8.6–10.2)
CHLORIDE BLD-SCNC: 101 MMOL/L (ref 98–107)
CO2: 28 MMOL/L (ref 22–29)
CREAT SERPL-MCNC: 0.9 MG/DL (ref 0.5–1)
GFR SERPL CREATININE-BSD FRML MDRD: >60 ML/MIN/1.73
GLUCOSE BLD-MCNC: 93 MG/DL (ref 74–99)
POTASSIUM SERPL-SCNC: 4.5 MMOL/L (ref 3.5–5)
PRO-BNP: 1053 PG/ML (ref 0–450)
SODIUM BLD-SCNC: 138 MMOL/L (ref 132–146)

## 2023-01-12 PROCEDURE — 36415 COLL VENOUS BLD VENIPUNCTURE: CPT

## 2023-01-12 PROCEDURE — 80048 BASIC METABOLIC PNL TOTAL CA: CPT

## 2023-01-12 PROCEDURE — 83880 ASSAY OF NATRIURETIC PEPTIDE: CPT

## 2023-01-12 PROCEDURE — 99214 OFFICE O/P EST MOD 30 MIN: CPT

## 2023-01-12 NOTE — PLAN OF CARE
Problem: Chronic Conditions and Co-morbidities  Goal: Patient's chronic conditions and co-morbidity symptoms are monitored and maintained or improved  1/12/2023 0854 by Jamison Coats RN  Outcome: Progressing  1/12/2023 0853 by Jamison Coats RN  Outcome: Progressing     Problem: Discharge Planning  Goal: Discharge to home or other facility with appropriate resources  1/12/2023 0854 by Jamison Coats RN  Outcome: Progressing  1/12/2023 0853 by Jamison Coats RN  Outcome: Progressing

## 2023-01-12 NOTE — PLAN OF CARE
Problem: Chronic Conditions and Co-morbidities  Goal: Patient's chronic conditions and co-morbidity symptoms are monitored and maintained or improved  Outcome: Progressing     Problem: Chronic Conditions and Co-morbidities  Goal: Patient's chronic conditions and co-morbidity symptoms are monitored and maintained or improved  Outcome: Progressing     Problem: Discharge Planning  Goal: Discharge to home or other facility with appropriate resources  Outcome: Progressing

## 2023-01-12 NOTE — PROGRESS NOTES
Congestive Heart Failure [address]   1945      Referring Provider: Dr. Stu Sherman with ED  Primary Care Physician: Dr. Gisele Reyes  Cardiologist: Dr. Jac Barger  Nephrologist: BEREKET    History of Present Illness:    Elan Powers is a 76 y.o. female with a history of type 2 diabetes, asthma, coronary artery disease, hyperlipidemia, hypertension, and CAD (follows with Dr. Jac Barger) was referred from ED physician at [address] [place] [address],[address] when patient declined admission to hospital for IV diuretic therapy on 10/26. Patient Story:  She does NOT have dyspnea with exertion, shortness of breath, or decline in overall functional capacity. She does not have orthopnea, PND, nocturnal cough or hemoptysis. She does NOT  have abdominal distention or bloating, early satiety, anorexia/change in appetite. She does has a good urinary response to oral diuretic. She does have lower extremity edema. She does not complain of lightheadedness, dizziness. She denies palpitations, syncope or near syncope. She does not complain of chest pain, pressure, discomfort. Allergies   Allergen Reactions    Asa [Aspirin] Shortness Of Breath    Pcn [Penicillins] Swelling    Iodine Rash         No outpatient medications have been marked as taking for the 1/12/23 encounter [facility] Encounter) with [facility] CHF ROOM 1.      Current Outpatient Medications on File Prior to Encounter   Medication Sig Dispense Refill    bumetanide (BUMEX) 1 MG tablet Take 1 tablet by mouth daily for 14 days 14 tablet 0    pantoprazole (PROTONIX) 40 MG tablet Take 40 mg by mouth daily (Patient not taking: No sig reported)      pioglitazone (ACTOS) 30 MG tablet Take 45 mg by mouth daily      clopidogrel (PLAVIX) 75 MG tablet Take 75 mg by mouth daily      atorvastatin (LIPITOR) 10 MG tablet Take 40 mg by mouth daily      atenolol (TENORMIN) 25 MG tablet Take 25 mg by mouth daily      budesonide-formoterol (SYMBICORT) 160-4.5 MCG/ACT AERO Inhale 2 puffs into the lungs 2 times daily      albuterol sulfate  (90 Base) MCG/ACT inhaler Inhale 2 puffs into the lungs every 6 hours as needed for Wheezing      azelastine (ASTELIN) 0.1 % nasal spray 1 spray by Nasal route 2 times daily Use in each nostril as directed      alendronate (FOSAMAX) 70 MG tablet Take 70 mg by mouth every 7 days      lisinopril (PRINIVIL;ZESTRIL) 10 MG tablet Take 10 mg by mouth daily. Multiple Vitamins-Minerals (CENTRUM SILVER PO) Take  by mouth daily. Misc Natural Products (OSTEO BI-FLEX ADV JOINT SHIELD PO) Take  by mouth. Calcium Carbonate-Vitamin D (CALTRATE 600+D PO) Take  by mouth daily. metformin (GLUCOPHAGE) 1000 MG tablet Take 500 mg by mouth 2 times daily (with meals)      Montelukast Sodium (SINGULAIR PO) Take 10 mg by mouth      meloxicam (MOBIC) 15 MG tablet Take 15 mg by mouth daily. omeprazole (PRILOSEC) 20 MG capsule Take 20 mg by mouth daily. (Patient not taking: No sig reported)      Tiotropium Bromide Monohydrate (SPIRIVA HANDIHALER IN) Inhale 18 mcg into the lungs       No current facility-administered medications on file prior to encounter.        Guideline directed medical:  ARNI/ACE I/ARB: Yes  Beta blocker:  Yes atenolol 25 mg  daily   Aldosterone antagonist:  No  SGLT2i:  No    Physical Examination:     /60 Comment: pt took her medications this AM  Pulse 70   Resp 18   Ht 5' 3\" (1.6 m)   Wt 203 lb 0.6 oz (92.1 kg)   SpO2 98%   BMI 35.97 kg/m²     Assessment  Charting Type: Shift assessment (chf clinic)    Neurological  Level of Consciousness: Alert (0)    Respiratory  Respiratory Pattern: Regular  Respiratory Depth: Normal  Respiratory Quality/Effort: Unlabored  Chest Assessment: Chest expansion symmetrical  L Breath Sounds: Clear, Diminished  R Breath Sounds: Clear, Diminished      Cardiac  Cardiac Regularity: Regular  Heart Sounds: S1, S2  Cardiac Rhythm: Sinus rhythm    Rhythm Interpretation  Heart Rate: 70    Gastrointestinal  Abdominal (WDL): Within Defined Limits     Peripheral Vascular  Peripheral Vascular (WDL): Exceptions to WDL  RLE Edema: Pitting, +1  LLE Edema: +1, Pitting    Genitourinary  Genitourinary (WDL): Within Defined Limits    Psychosocial  Psychosocial (WDL): Within Defined Limits    Heart Rate: 70      LAB DATA:    Last 3 BMP      Sodium (mmol/L)   Date Value   01/12/2023 138   01/06/2023 135   12/02/2022 140     Potassium (mmol/L)   Date Value   01/12/2023 4.5   01/06/2023 5.1 (H)   12/02/2022 3.8     Potassium reflex Magnesium (mmol/L)   Date Value   10/26/2022 3.8     Chloride (mmol/L)   Date Value   01/12/2023 101   01/06/2023 101   12/02/2022 101     CO2 (mmol/L)   Date Value   01/12/2023 28   01/06/2023 24   12/02/2022 30 (H)     BUN (mg/dL)   Date Value   01/12/2023 33 (H)   01/06/2023 31 (H)   12/02/2022 39 (H)     Glucose (mg/dL)   Date Value   01/12/2023 93   01/06/2023 98   12/02/2022 101 (H)     Calcium (mg/dL)   Date Value   01/12/2023 9.3   01/06/2023 9.5   12/02/2022 9.4       Last 3 BNP       Pro-BNP (pg/mL)   Date Value   01/12/2023 1,053 (H)   01/06/2023 1,304 (H)   12/02/2022 1,096 (H)          CBC: No results for input(s): WBC, HGB, PLT in the last 72 hours. BMP:    Recent Labs     01/12/23  0900      K 4.5      CO2 28   BUN 33*   CREATININE 0.9   GLUCOSE 93           Hepatic: No results for input(s): AST, ALT, ALB, BILITOT, ALKPHOS in the last 72 hours. Troponin: No results for input(s): TROPONINI in the last 72 hours. BNP: No results for input(s): BNP in the last 72 hours. Lipids: No results for input(s): CHOL, HDL in the last 72 hours. Invalid input(s): LDLCALCU  INR: No results for input(s): INR in the last 72 hours.     WEIGHTS:    Wt Readings from Last 3 Encounters:   01/12/23 203 lb 0.6 oz (92.1 kg)   01/06/23 204 lb (92.5 kg)   12/02/22 205 lb (93 kg)     TELEMETRY:  Cardiac Regularity: Regular  Cardiac Rhythm/Interpretation: SR    ASSESSMENT:  Adalgisa Thao presents for CHF clinic follow up visit after receiving IV push diuretics. Pt had good urine response and noticed a improvement in her NIETO. Upon assessment bilateral lungs are  clear, abdomen soft, +1 pitting edema BLE. Denies any SOB at rest, NIETO and orthopnea. Pt feels good today. Pt states she sees DR. Cory Mars in 2/2023. PT states she does not see Dr. Joycelyn Love until 2/2024    Follow up made for 1 month. Pt is unsure of the dosing of her bumex-- I will call the pharmacy today to confirm dosing. 9:56 AM [facility] re: Bumex dosing. Pt picked up the newest prescription by PCP on 1/5/2022 for a 60 day supply of Bumex 0.5 mg BID every other day. Interventions completed this visit:  IV diuretics given: NO  Lab work obtained: yes, pro-BNP, BMP  Reviewed currently prescribed medications with patient, educated on importance of compliance and answered any questions regarding their medication  Educated on signs and symptoms of HF  Educated on low sodium diet    PLAN:  Scheduled to follow up in CHF clinic on   Future Appointments   Date Time Provider Katherin Rogers   2/14/2023  8:30 AM [facility] CHF ROOM [address]       Given clinic phone number and aware of signs and symptoms to call with any HF change in symptoms.

## 2023-02-14 ENCOUNTER — HOSPITAL ENCOUNTER (OUTPATIENT)
Dept: OTHER | Age: 78
Setting detail: THERAPIES SERIES
Discharge: HOME | End: 2023-02-14
Payer: MEDICARE

## 2023-02-14 VITALS
DIASTOLIC BLOOD PRESSURE: 51 MMHG | SYSTOLIC BLOOD PRESSURE: 109 MMHG | RESPIRATION RATE: 18 BRPM | OXYGEN SATURATION: 99 % | BODY MASS INDEX: 36.67 KG/M2 | WEIGHT: 207 LBS | HEART RATE: 75 BPM

## 2023-02-14 LAB
ANION GAP SERPL CALCULATED.3IONS-SCNC: 13 MMOL/L (ref 7–16)
BUN BLDV-MCNC: 31 MG/DL (ref 6–23)
CALCIUM SERPL-MCNC: 9.7 MG/DL (ref 8.6–10.2)
CHLORIDE BLD-SCNC: 103 MMOL/L (ref 98–107)
CO2: 26 MMOL/L (ref 22–29)
CREAT SERPL-MCNC: 0.8 MG/DL (ref 0.5–1)
GFR SERPL CREATININE-BSD FRML MDRD: >60 ML/MIN/1.73
GLUCOSE BLD-MCNC: 103 MG/DL (ref 74–99)
POTASSIUM SERPL-SCNC: 4.1 MMOL/L (ref 3.5–5)
PRO-BNP: 1468 PG/ML (ref 0–450)
SODIUM BLD-SCNC: 142 MMOL/L (ref 132–146)

## 2023-02-14 PROCEDURE — 96374 THER/PROPH/DIAG INJ IV PUSH: CPT

## 2023-02-14 PROCEDURE — 2580000003 HC RX 258: Performed by: INTERNAL MEDICINE

## 2023-02-14 PROCEDURE — 99214 OFFICE O/P EST MOD 30 MIN: CPT

## 2023-02-14 PROCEDURE — 2500000003 HC RX 250 WO HCPCS: Performed by: INTERNAL MEDICINE

## 2023-02-14 PROCEDURE — 36415 COLL VENOUS BLD VENIPUNCTURE: CPT

## 2023-02-14 PROCEDURE — 83880 ASSAY OF NATRIURETIC PEPTIDE: CPT

## 2023-02-14 PROCEDURE — 80048 BASIC METABOLIC PNL TOTAL CA: CPT

## 2023-02-14 RX ORDER — BUMETANIDE 0.25 MG/ML
2 INJECTION, SOLUTION INTRAMUSCULAR; INTRAVENOUS ONCE
Status: COMPLETED | OUTPATIENT
Start: 2023-02-14 | End: 2023-02-14

## 2023-02-14 RX ORDER — SODIUM CHLORIDE 0.9 % (FLUSH) 0.9 %
10 SYRINGE (ML) INJECTION ONCE
Status: COMPLETED | OUTPATIENT
Start: 2023-02-14 | End: 2023-02-14

## 2023-02-14 RX ADMIN — Medication 10 ML: at 09:11

## 2023-02-14 RX ADMIN — BUMETANIDE 2 MG: 0.25 INJECTION INTRAMUSCULAR; INTRAVENOUS at 09:11

## 2023-02-14 NOTE — PROGRESS NOTES
Congestive Heart Failure [address]   1945    Referring Provider: Dr. Tamika Blackman with ED  Primary Care Physician: Dr. Modesto Leger  Cardiologist: Dr. Merlin Frank  Nephrologist: BEREKET    History of Present Illness:    Kevyn Miller is a [de-identified] y.o. female with a history of type 2 diabetes, asthma, coronary artery disease, hyperlipidemia, hypertension, and CAD (follows with Dr. Merlin Frank) was referred from ED physician at [address] [place] [address],[address] when patient declined admission to hospital for IV diuretic therapy on 10/26. Patient Story:  She does have visible dyspnea with exertion, shortness of breath, or decline in overall functional capacity. She does not have orthopnea, PND, nocturnal cough or hemoptysis. She does have abdominal distention or bloating, early satiety, anorexia/change in appetite. She does have a good urinary response to oral diuretic. She does have lower extremity edema. She does not complain of lightheadedness, dizziness. She denies palpitations, syncope or near syncope. She does not complain of chest pain, pressure, discomfort. Allergies   Allergen Reactions    Asa [Aspirin] Shortness Of Breath    Pcn [Penicillins] Swelling    Iodine Rash         No outpatient medications have been marked as taking for the 2/14/23 encounter [facility] Encounter) with [facility] CHF ROOM 1. Current Outpatient Medications on File Prior to Encounter   Medication Sig Dispense Refill    bumetanide (BUMEX) 1 MG tablet Take 1 tablet by mouth daily for 14 days (Patient taking differently: Take 1 mg by mouth daily 2/13/23- 1 mg daily per PCP Modesto Leger. ) 14 tablet 0    pantoprazole (PROTONIX) 40 MG tablet Take 40 mg by mouth daily (Patient not taking: No sig reported)      pioglitazone (ACTOS) 30 MG tablet Take 45 mg by mouth daily      clopidogrel (PLAVIX) 75 MG tablet Take 75 mg by mouth daily      atorvastatin (LIPITOR) 10 MG tablet Take 40 mg by mouth daily      atenolol (TENORMIN) 25 MG tablet Take 25 mg by mouth daily      budesonide-formoterol (SYMBICORT) 160-4.5 MCG/ACT AERO Inhale 2 puffs into the lungs 2 times daily      albuterol sulfate  (90 Base) MCG/ACT inhaler Inhale 2 puffs into the lungs every 6 hours as needed for Wheezing      azelastine (ASTELIN) 0.1 % nasal spray 1 spray by Nasal route 2 times daily Use in each nostril as directed      alendronate (FOSAMAX) 70 MG tablet Take 70 mg by mouth every 7 days      lisinopril (PRINIVIL;ZESTRIL) 10 MG tablet Take 10 mg by mouth daily. Multiple Vitamins-Minerals (CENTRUM SILVER PO) Take  by mouth daily. Misc Natural Products (OSTEO BI-FLEX ADV JOINT SHIELD PO) Take  by mouth. Calcium Carbonate-Vitamin D (CALTRATE 600+D PO) Take  by mouth daily. metformin (GLUCOPHAGE) 1000 MG tablet Take 500 mg by mouth 2 times daily (with meals)      Montelukast Sodium (SINGULAIR PO) Take 10 mg by mouth      meloxicam (MOBIC) 15 MG tablet Take 15 mg by mouth daily. omeprazole (PRILOSEC) 20 MG capsule Take 20 mg by mouth daily. (Patient not taking: No sig reported)      Tiotropium Bromide Monohydrate (SPIRIVA HANDIHALER IN) Inhale 18 mcg into the lungs       No current facility-administered medications on file prior to encounter. Guideline directed medical:  ARNI/ACE I/ARB: Yes  Beta blocker:   Yes   Aldosterone antagonist:  No  SGLT2i:  No    Physical Examination:     BP (!) 109/51   Pulse 75   Resp 18   Wt 207 lb (93.9 kg)   SpO2 99%   BMI 36.67 kg/m²     Assessment  Charting Type: Shift assessment (chf clinic)    Neurological  Level of Consciousness: Alert (0)    Respiratory  Respiratory Pattern: Regular  Respiratory Depth: Normal  Respiratory Quality/Effort: Dyspnea with exertion  Chest Assessment: Chest expansion symmetrical  L Breath Sounds: Fine Crackles  R Breath Sounds: Clear, Diminished      Cardiac  Cardiac Regularity: Regular  Heart Sounds: S1, S2  Cardiac Rhythm: Sinus rhythm    Rhythm Interpretation  Heart Rate: 75    Gastrointestinal  Abdominal (WDL): Exceptions to WDL  Abdomen Inspection: Rounded  GI Symptoms: Bloating     Peripheral Vascular  Peripheral Vascular (WDL): Exceptions to WDL  RLE Edema: Pitting, +1  LLE Edema: +1, Pitting    Genitourinary  Genitourinary (WDL): Within Defined Limits    Psychosocial  Psychosocial (WDL): Within Defined Limits    Heart Rate: 75      LAB DATA:    Last 3 BMP      Sodium (mmol/L)   Date Value   02/14/2023 142   01/12/2023 138   01/06/2023 135     Potassium (mmol/L)   Date Value   02/14/2023 4.1   01/12/2023 4.5   01/06/2023 5.1 (H)     Potassium reflex Magnesium (mmol/L)   Date Value   10/26/2022 3.8     Chloride (mmol/L)   Date Value   02/14/2023 103   01/12/2023 101   01/06/2023 101     CO2 (mmol/L)   Date Value   02/14/2023 26   01/12/2023 28   01/06/2023 24     BUN (mg/dL)   Date Value   02/14/2023 31 (H)   01/12/2023 33 (H)   01/06/2023 31 (H)     Glucose (mg/dL)   Date Value   02/14/2023 103 (H)   01/12/2023 93   01/06/2023 98     Calcium (mg/dL)   Date Value   02/14/2023 9.7   01/12/2023 9.3   01/06/2023 9.5       Last 3 BNP       Pro-BNP (pg/mL)   Date Value   02/14/2023 1,468 (H)   01/12/2023 1,053 (H)   01/06/2023 1,304 (H)          CBC: No results for input(s): WBC, HGB, PLT in the last 72 hours. BMP:    Recent Labs     02/14/23  0910      K 4.1      CO2 26   BUN 31*   CREATININE 0.8   GLUCOSE 103*             Hepatic: No results for input(s): AST, ALT, ALB, BILITOT, ALKPHOS in the last 72 hours. Troponin: No results for input(s): TROPONINI in the last 72 hours. BNP: No results for input(s): BNP in the last 72 hours. Lipids: No results for input(s): CHOL, HDL in the last 72 hours. Invalid input(s): LDLCALCU  INR: No results for input(s): INR in the last 72 hours.     WEIGHTS:    Wt Readings from Last 3 Encounters:   02/14/23 207 lb (93.9 kg)   01/12/23 203 lb 0.6 oz (92.1 kg)   01/06/23 204 lb (92.5 kg)     TELEMETRY:  Cardiac Regularity: Regular  Cardiac Rhythm/Interpretation: SR    ASSESSMENT:  Austin Humphrey presents for CHF clinic follow up visit with 4 lb weight gain. Patient reports being out of diuretic (Bumex) for a few days. Patient picked up script from pharmacy yesterday (sent by PCP); Bumex 1 mg daily (called patient's pharmacy to verify). Patient seemingly SOB with exertion; fine crackles noted in left lung base. Complaint of bloat and distention. Admits to dietary indiscretions over the weekend. Agreeable to IVP diuretic for SOB, weight gain, and missed doses of oral Bumex. Interventions completed this visit:  IV diuretics given: yes- bumex 2 mg IVP once. Lab work obtained: yes, pro-BNP, BMP  Reviewed currently prescribed medications with patient, educated on importance of compliance and answered any questions regarding their medication  Educated on signs and symptoms of HF  Educated on low sodium diet    PLAN:  Scheduled to follow up in CHF clinic on   Future Appointments   Date Time Provider Katherin Rogers   2/27/2023  9:30 AM [facility] CHF ROOM [address]       Given clinic phone number and aware of signs and symptoms to call with any HF change in symptoms.

## 2023-02-27 ENCOUNTER — HOSPITAL ENCOUNTER (OUTPATIENT)
Dept: OTHER | Age: 78
Setting detail: THERAPIES SERIES
Discharge: HOME | End: 2023-02-27
Payer: MEDICARE

## 2023-02-27 VITALS
OXYGEN SATURATION: 98 % | BODY MASS INDEX: 36.24 KG/M2 | WEIGHT: 204.6 LBS | RESPIRATION RATE: 18 BRPM | SYSTOLIC BLOOD PRESSURE: 110 MMHG | DIASTOLIC BLOOD PRESSURE: 52 MMHG | HEART RATE: 77 BPM

## 2023-02-27 LAB
ANION GAP SERPL CALCULATED.3IONS-SCNC: 12 MMOL/L (ref 7–16)
BUN BLDV-MCNC: 26 MG/DL (ref 6–23)
CALCIUM SERPL-MCNC: 9.5 MG/DL (ref 8.6–10.2)
CHLORIDE BLD-SCNC: 103 MMOL/L (ref 98–107)
CO2: 27 MMOL/L (ref 22–29)
CREAT SERPL-MCNC: 0.9 MG/DL (ref 0.5–1)
GFR SERPL CREATININE-BSD FRML MDRD: >60 ML/MIN/1.73
GLUCOSE BLD-MCNC: 110 MG/DL (ref 74–99)
POTASSIUM SERPL-SCNC: 4.1 MMOL/L (ref 3.5–5)
PRO-BNP: 1234 PG/ML (ref 0–450)
SODIUM BLD-SCNC: 142 MMOL/L (ref 132–146)

## 2023-02-27 PROCEDURE — 83880 ASSAY OF NATRIURETIC PEPTIDE: CPT

## 2023-02-27 PROCEDURE — 80048 BASIC METABOLIC PNL TOTAL CA: CPT

## 2023-02-27 PROCEDURE — 36415 COLL VENOUS BLD VENIPUNCTURE: CPT

## 2023-02-27 PROCEDURE — 99214 OFFICE O/P EST MOD 30 MIN: CPT

## 2023-02-27 NOTE — PROGRESS NOTES
Congestive Heart Failure [address]   1945    Referring Provider: Dr. Aidee Sheppard with ED  Primary Care Physician: Dr. Kristan Monge  Cardiologist: Dr. South Post  Nephrologist: BEREKET    History of Present Illness:    Man Vasquez is a [de-identified] y.o. female with a history of type 2 diabetes, asthma, coronary artery disease, hyperlipidemia, hypertension, and CAD (follows with Dr. South Post) was referred from ED physician at [facility],[address] when patient declined admission to hospital for IV diuretic therapy on 10/26. Patient Story:  She does have visible dyspnea with exertion, shortness of breath, or decline in overall functional capacity. She does not have orthopnea, PND, nocturnal cough or hemoptysis. She does have abdominal distention or bloating, early satiety, anorexia/change in appetite. She does have a good urinary response to oral diuretic. She does have lower extremity edema. She does not complain of lightheadedness, dizziness. She denies palpitations, syncope or near syncope. She does not complain of chest pain, pressure, discomfort. Allergies   Allergen Reactions    Asa [Aspirin] Shortness Of Breath    Pcn [Penicillins] Swelling    Iodine Rash         Outpatient Medications Marked as Taking for the 2/27/23 encounter [facility] Encounter) with [facility] CHF ROOM 1   Medication Sig Dispense Refill    metFORMIN (GLUCOPHAGE) 500 MG tablet Take 500 mg by mouth 2 times daily (with meals)       Current Outpatient Medications on File Prior to Encounter   Medication Sig Dispense Refill    metFORMIN (GLUCOPHAGE) 500 MG tablet Take 500 mg by mouth 2 times daily (with meals)      bumetanide (BUMEX) 1 MG tablet Take 1 tablet by mouth daily for 14 days (Patient taking differently: Take 1 mg by mouth daily 2/13/23- 1 mg daily per PCP Kristan Monge. ) 14 tablet 0    pantoprazole (PROTONIX) 40 MG tablet Take 40 mg by mouth daily (Patient not taking: No sig reported)      pioglitazone (ACTOS) 30 MG tablet Take 45 mg by mouth daily      clopidogrel (PLAVIX) 75 MG tablet Take 75 mg by mouth daily      atorvastatin (LIPITOR) 10 MG tablet Take 40 mg by mouth daily      atenolol (TENORMIN) 25 MG tablet Take 25 mg by mouth daily      budesonide-formoterol (SYMBICORT) 160-4.5 MCG/ACT AERO Inhale 2 puffs into the lungs 2 times daily      albuterol sulfate  (90 Base) MCG/ACT inhaler Inhale 2 puffs into the lungs every 6 hours as needed for Wheezing      azelastine (ASTELIN) 0.1 % nasal spray 1 spray by Nasal route 2 times daily Use in each nostril as directed      alendronate (FOSAMAX) 70 MG tablet Take 70 mg by mouth every 7 days      lisinopril (PRINIVIL;ZESTRIL) 10 MG tablet Take 10 mg by mouth daily. Multiple Vitamins-Minerals (CENTRUM SILVER PO) Take  by mouth daily. Misc Natural Products (OSTEO BI-FLEX ADV JOINT SHIELD PO) Take  by mouth. Calcium Carbonate-Vitamin D (CALTRATE 600+D PO) Take  by mouth daily. metformin (GLUCOPHAGE) 1000 MG tablet Take 500 mg by mouth 2 times daily (with meals)      Montelukast Sodium (SINGULAIR PO) Take 10 mg by mouth      meloxicam (MOBIC) 15 MG tablet Take 15 mg by mouth daily. omeprazole (PRILOSEC) 20 MG capsule Take 20 mg by mouth daily. (Patient not taking: No sig reported)      Tiotropium Bromide Monohydrate (SPIRIVA HANDIHALER IN) Inhale 18 mcg into the lungs       No current facility-administered medications on file prior to encounter. Guideline directed medical:  ARNI/ACE I/ARB: Yes  Beta blocker:   Yes   Aldosterone antagonist:  No  SGLT2i:  No    Physical Examination:     BP (!) 110/52   Pulse 77   Resp 18   Wt 204 lb 9.6 oz (92.8 kg)   SpO2 98%   BMI 36.24 kg/m²     Assessment  Charting Type: Shift assessment (chf clinic)    Neurological  Level of Consciousness: Alert (0)    Respiratory  Respiratory Pattern: Regular  Respiratory Depth: Normal  Respiratory Quality/Effort: Dyspnea with exertion  Chest Assessment: Chest expansion symmetrical  R Breath Sounds: Clear, Diminished      Cardiac  Cardiac Regularity: Regular  Heart Sounds: S1, S2  Cardiac Rhythm: Sinus rhythm    Rhythm Interpretation  Heart Rate: 77    Gastrointestinal  Abdominal (WDL): Exceptions to WDL  Abdomen Inspection: Rounded     Peripheral Vascular  Peripheral Vascular (WDL): Exceptions to WDL  RLE Edema: Pitting, +1, Trace  LLE Edema: +1, Pitting, Trace    Genitourinary  Genitourinary (WDL): Within Defined Limits    Psychosocial  Psychosocial (WDL): Within Defined Limits    Heart Rate: 77      LAB DATA:    Last 3 BMP      Sodium (mmol/L)   Date Value   02/14/2023 142   01/12/2023 138   01/06/2023 135     Potassium (mmol/L)   Date Value   02/14/2023 4.1   01/12/2023 4.5   01/06/2023 5.1 (H)     Potassium reflex Magnesium (mmol/L)   Date Value   10/26/2022 3.8     Chloride (mmol/L)   Date Value   02/14/2023 103   01/12/2023 101   01/06/2023 101     CO2 (mmol/L)   Date Value   02/14/2023 26   01/12/2023 28   01/06/2023 24     BUN (mg/dL)   Date Value   02/14/2023 31 (H)   01/12/2023 33 (H)   01/06/2023 31 (H)     Glucose (mg/dL)   Date Value   02/14/2023 103 (H)   01/12/2023 93   01/06/2023 98     Calcium (mg/dL)   Date Value   02/14/2023 9.7   01/12/2023 9.3   01/06/2023 9.5       Last 3 BNP       Pro-BNP (pg/mL)   Date Value   02/14/2023 1,468 (H)   01/12/2023 1,053 (H)   01/06/2023 1,304 (H)          CBC: No results for input(s): WBC, HGB, PLT in the last 72 hours. BMP:    No results for input(s): NA, K, CL, CO2, BUN, CREATININE, GLUCOSE in the last 72 hours. Hepatic: No results for input(s): AST, ALT, ALB, BILITOT, ALKPHOS in the last 72 hours. Troponin: No results for input(s): TROPONINI in the last 72 hours. BNP: No results for input(s): BNP in the last 72 hours. Lipids: No results for input(s): CHOL, HDL in the last 72 hours. Invalid input(s): LDLCALCU  INR: No results for input(s): INR in the last 72 hours.     WEIGHTS:    Wt Readings from Last 3 Encounters:   02/27/23 204 lb 9.6 oz (92.8 kg)   02/14/23 207 lb (93.9 kg)   01/12/23 203 lb 0.6 oz (92.1 kg)     TELEMETRY:  Cardiac Regularity: Regular  Cardiac Rhythm/Interpretation: SR    ASSESSMENT:  Lauro Silva presents for CHF clinic follow up visit with 2 lb weight loss. Patient has been taking Bumex 1 mg daily and states a good response with diuretic. Patient denies shortness of breath, chest pain, or being lightheaded or dizzy. Patient states she has been following a low sodium diet and has been drinking 64 ounces of water daily. Interventions completed this visit:  IV diuretics given:no  Lab work obtained: yes, pro-BNP, BMP  Reviewed currently prescribed medications with patient, educated on importance of compliance and answered any questions regarding their medication  Educated on signs and symptoms of HF  Educated on low sodium diet    PLAN:  Scheduled to follow up in CHF clinic on   No future appointments. Given clinic phone number and aware of signs and symptoms to call with any HF change in symptoms.

## 2023-03-20 ENCOUNTER — HOSPITAL ENCOUNTER (OUTPATIENT)
Dept: OTHER | Age: 78
Setting detail: THERAPIES SERIES
Discharge: HOME | End: 2023-03-20

## 2023-03-20 NOTE — PROGRESS NOTES
Patient called to cancel CHF appointment for today. Patient stated she does not feel well and rescheduled for March 31 and told to call the CHF if she is experiencing shortness of breath, weight gain, or edema prior to appointment.

## 2023-03-29 ENCOUNTER — HOSPITAL ENCOUNTER (OUTPATIENT)
Dept: OTHER | Age: 78
Setting detail: THERAPIES SERIES
Discharge: HOME | End: 2023-03-29
Payer: MEDICARE

## 2023-03-29 VITALS
OXYGEN SATURATION: 96 % | WEIGHT: 200 LBS | HEART RATE: 66 BPM | BODY MASS INDEX: 35.43 KG/M2 | SYSTOLIC BLOOD PRESSURE: 110 MMHG | DIASTOLIC BLOOD PRESSURE: 56 MMHG | RESPIRATION RATE: 18 BRPM

## 2023-03-29 LAB
ANION GAP SERPL CALCULATED.3IONS-SCNC: 14 MMOL/L (ref 7–16)
BNP BLD-MCNC: 1451 PG/ML (ref 0–450)
BUN SERPL-MCNC: 33 MG/DL (ref 6–23)
CALCIUM SERPL-MCNC: 9.6 MG/DL (ref 8.6–10.2)
CHLORIDE SERPL-SCNC: 102 MMOL/L (ref 98–107)
CO2 SERPL-SCNC: 23 MMOL/L (ref 22–29)
CREAT SERPL-MCNC: 0.9 MG/DL (ref 0.5–1)
GLUCOSE SERPL-MCNC: 95 MG/DL (ref 74–99)
POTASSIUM SERPL-SCNC: 4.2 MMOL/L (ref 3.5–5)
SODIUM SERPL-SCNC: 139 MMOL/L (ref 132–146)

## 2023-03-29 PROCEDURE — 80048 BASIC METABOLIC PNL TOTAL CA: CPT

## 2023-03-29 PROCEDURE — 36415 COLL VENOUS BLD VENIPUNCTURE: CPT

## 2023-03-29 PROCEDURE — 83880 ASSAY OF NATRIURETIC PEPTIDE: CPT

## 2023-03-29 PROCEDURE — 99214 OFFICE O/P EST MOD 30 MIN: CPT

## 2023-03-29 NOTE — PROGRESS NOTES
Congestive Heart Failure [address]   1945    Referring Provider: Dr. Josephine Lindsay with ED  Primary Care Physician: Dr. Mike Welch  Cardiologist: Dr. Maida Watts  Nephrologist: BEREKET    History of Present Illness:    Camille Anglin is a [de-identified] y.o. female with a history of type 2 diabetes, asthma, coronary artery disease, hyperlipidemia, hypertension, and CAD (follows with Dr. Maida Watts)     Patient Story:  She does not have visible dyspnea with exertion, shortness of breath, or decline in overall functional capacity. She does not have orthopnea, PND, nocturnal cough or hemoptysis. She does not have abdominal distention or bloating, early satiety, anorexia/change in appetite. She does have a good urinary response to oral diuretic. She does have lower extremity edema. She does complain of lightheadedness, dizziness at times. She denies palpitations, syncope or near syncope. She did have an episode of chest pain, pressure, discomfort. Pt had and episode yesterday - lightheaded, dizzy and then had epistaxis and chest pain \"ache\" that went away after 10 min. Vitals at that time /72 (elevated for her) HR 74. Instructed her to call and notify Dr. Maida Watts of symptoms. Allergies   Allergen Reactions    Asa [Aspirin] Shortness Of Breath    Pcn [Penicillins] Swelling    Iodine Rash       No outpatient medications have been marked as taking for the 3/29/23 encounter [facility] Encounter) with [facility] CHF ROOM 1. Current Outpatient Medications on File Prior to Encounter   Medication Sig Dispense Refill    metFORMIN (GLUCOPHAGE) 500 MG tablet Take 500 mg by mouth 2 times daily (with meals)      bumetanide (BUMEX) 1 MG tablet Take 1 tablet by mouth daily for 14 days (Patient taking differently: Take 1 mg by mouth daily 2/13/23- 1 mg daily per PCP Mike Welch. ) 14 tablet 0    pantoprazole (PROTONIX) 40 MG tablet Take 40 mg by mouth daily (Patient not taking: No sig reported)

## 2023-04-04 ENCOUNTER — APPOINTMENT (OUTPATIENT)
Dept: GENERAL RADIOLOGY | Age: 78
End: 2023-04-04
Payer: MEDICARE

## 2023-04-04 ENCOUNTER — HOSPITAL ENCOUNTER (OUTPATIENT)
Age: 78
Setting detail: OBSERVATION
Discharge: ANOTHER HOSPITAL | End: 2023-04-06
Attending: EMERGENCY MEDICINE | Admitting: INTERNAL MEDICINE
Payer: MEDICARE

## 2023-04-04 DIAGNOSIS — R07.9 CHEST PAIN, UNSPECIFIED TYPE: Primary | ICD-10-CM

## 2023-04-04 PROBLEM — I25.119 CHEST PAIN DUE TO CAD (HCC): Status: ACTIVE | Noted: 2023-04-04

## 2023-04-04 LAB
ALBUMIN SERPL-MCNC: 4.2 G/DL (ref 3.5–5.2)
ALP SERPL-CCNC: 65 U/L (ref 35–104)
ALT SERPL-CCNC: 13 U/L (ref 0–32)
ANION GAP SERPL CALCULATED.3IONS-SCNC: 12 MMOL/L (ref 7–16)
AST SERPL-CCNC: 22 U/L (ref 0–31)
BASOPHILS # BLD: 0.04 E9/L (ref 0–0.2)
BASOPHILS NFR BLD: 0.7 % (ref 0–2)
BILIRUB SERPL-MCNC: 0.4 MG/DL (ref 0–1.2)
BILIRUB UR QL STRIP: NEGATIVE
BUN SERPL-MCNC: 32 MG/DL (ref 6–23)
CALCIUM SERPL-MCNC: 9.8 MG/DL (ref 8.6–10.2)
CHLORIDE SERPL-SCNC: 100 MMOL/L (ref 98–107)
CHP ED QC CHECK: 90
CLARITY UR: CLEAR
CO2 SERPL-SCNC: 25 MMOL/L (ref 22–29)
COLOR UR: YELLOW
CREAT SERPL-MCNC: 1 MG/DL (ref 0.5–1)
D DIMER: <200 NG/ML DDU
EKG ATRIAL RATE: 71 BPM
EKG P AXIS: 87 DEGREES
EKG P-R INTERVAL: 164 MS
EKG Q-T INTERVAL: 392 MS
EKG QRS DURATION: 78 MS
EKG QTC CALCULATION (BAZETT): 425 MS
EKG R AXIS: 80 DEGREES
EKG T AXIS: 11 DEGREES
EKG VENTRICULAR RATE: 71 BPM
EOSINOPHIL # BLD: 0.17 E9/L (ref 0.05–0.5)
EOSINOPHIL NFR BLD: 2.9 % (ref 0–6)
ERYTHROCYTE [DISTWIDTH] IN BLOOD BY AUTOMATED COUNT: 14.1 FL (ref 11.5–15)
GLUCOSE BLD-MCNC: 90 MG/DL
GLUCOSE SERPL-MCNC: 91 MG/DL (ref 74–99)
GLUCOSE UR STRIP-MCNC: NEGATIVE MG/DL
HCT VFR BLD AUTO: 36.3 % (ref 34–48)
HGB BLD-MCNC: 11.7 G/DL (ref 11.5–15.5)
HGB UR QL STRIP: NEGATIVE
IMM GRANULOCYTES # BLD: 0.02 E9/L
IMM GRANULOCYTES NFR BLD: 0.3 % (ref 0–5)
INR BLD: 1.1
KETONES UR STRIP-MCNC: NEGATIVE MG/DL
LEFT VENTRICULAR EJECTION FRACTION HIGH VALUE: 68 %
LEUKOCYTE ESTERASE UR QL STRIP: NEGATIVE
LIPASE: 40 U/L (ref 13–60)
LV EF: 68 %
LYMPHOCYTES # BLD: 1.67 E9/L (ref 1.5–4)
LYMPHOCYTES NFR BLD: 28.4 % (ref 20–42)
MCH RBC QN AUTO: 30.4 PG (ref 26–35)
MCHC RBC AUTO-ENTMCNC: 32.2 % (ref 32–34.5)
MCV RBC AUTO: 94.3 FL (ref 80–99.9)
METER GLUCOSE: 104 MG/DL (ref 74–99)
METER GLUCOSE: 90 MG/DL (ref 74–99)
MONOCYTES # BLD: 0.77 E9/L (ref 0.1–0.95)
MONOCYTES NFR BLD: 13.1 % (ref 2–12)
NEUTROPHILS # BLD: 3.22 E9/L (ref 1.8–7.3)
NEUTS SEG NFR BLD: 54.6 % (ref 43–80)
NITRITE UR QL STRIP: NEGATIVE
PH UR STRIP: 7 [PH] (ref 5–9)
PLATELET # BLD AUTO: 177 E9/L (ref 130–450)
PMV BLD AUTO: 10.9 FL (ref 7–12)
POTASSIUM SERPL-SCNC: 4.1 MMOL/L (ref 3.5–5)
PROT SERPL-MCNC: 6.5 G/DL (ref 6.4–8.3)
PROT UR STRIP-MCNC: NEGATIVE MG/DL
PROTHROMBIN TIME: 12.1 SEC (ref 9.3–12.4)
RBC # BLD AUTO: 3.85 E12/L (ref 3.5–5.5)
SODIUM SERPL-SCNC: 137 MMOL/L (ref 132–146)
SP GR UR STRIP: <=1.005 (ref 1–1.03)
TROPONIN, HIGH SENSITIVITY: 16 NG/L (ref 0–9)
TROPONIN, HIGH SENSITIVITY: 16 NG/L (ref 0–9)
UROBILINOGEN UR STRIP-ACNC: 0.2 E.U./DL
WBC # BLD: 5.9 E9/L (ref 4.5–11.5)

## 2023-04-04 PROCEDURE — 6370000000 HC RX 637 (ALT 250 FOR IP)

## 2023-04-04 PROCEDURE — 96372 THER/PROPH/DIAG INJ SC/IM: CPT

## 2023-04-04 PROCEDURE — 85610 PROTHROMBIN TIME: CPT

## 2023-04-04 PROCEDURE — 2580000003 HC RX 258: Performed by: EMERGENCY MEDICINE

## 2023-04-04 PROCEDURE — 82962 GLUCOSE BLOOD TEST: CPT

## 2023-04-04 PROCEDURE — 71045 X-RAY EXAM CHEST 1 VIEW: CPT

## 2023-04-04 PROCEDURE — 80053 COMPREHEN METABOLIC PANEL: CPT

## 2023-04-04 PROCEDURE — 96361 HYDRATE IV INFUSION ADD-ON: CPT

## 2023-04-04 PROCEDURE — 81003 URINALYSIS AUTO W/O SCOPE: CPT

## 2023-04-04 PROCEDURE — 99285 EMERGENCY DEPT VISIT HI MDM: CPT

## 2023-04-04 PROCEDURE — G0378 HOSPITAL OBSERVATION PER HR: HCPCS

## 2023-04-04 PROCEDURE — 6360000002 HC RX W HCPCS

## 2023-04-04 PROCEDURE — 85378 FIBRIN DEGRADE SEMIQUANT: CPT

## 2023-04-04 PROCEDURE — 93010 ELECTROCARDIOGRAM REPORT: CPT | Performed by: INTERNAL MEDICINE

## 2023-04-04 PROCEDURE — 83690 ASSAY OF LIPASE: CPT

## 2023-04-04 PROCEDURE — 36415 COLL VENOUS BLD VENIPUNCTURE: CPT

## 2023-04-04 PROCEDURE — 85025 COMPLETE CBC W/AUTO DIFF WBC: CPT

## 2023-04-04 PROCEDURE — 93005 ELECTROCARDIOGRAM TRACING: CPT | Performed by: EMERGENCY MEDICINE

## 2023-04-04 PROCEDURE — 6370000000 HC RX 637 (ALT 250 FOR IP): Performed by: EMERGENCY MEDICINE

## 2023-04-04 PROCEDURE — 84484 ASSAY OF TROPONIN QUANT: CPT

## 2023-04-04 PROCEDURE — 94664 DEMO&/EVAL PT USE INHALER: CPT

## 2023-04-04 PROCEDURE — 94640 AIRWAY INHALATION TREATMENT: CPT

## 2023-04-04 RX ORDER — ATORVASTATIN CALCIUM 40 MG/1
40 TABLET, FILM COATED ORAL DAILY
Status: DISCONTINUED | OUTPATIENT
Start: 2023-04-04 | End: 2023-04-06 | Stop reason: HOSPADM

## 2023-04-04 RX ORDER — SODIUM CHLORIDE 9 MG/ML
INJECTION, SOLUTION INTRAVENOUS CONTINUOUS
Status: DISCONTINUED | OUTPATIENT
Start: 2023-04-04 | End: 2023-04-05

## 2023-04-04 RX ORDER — BUMETANIDE 1 MG/1
1 TABLET ORAL DAILY
Status: DISCONTINUED | OUTPATIENT
Start: 2023-04-05 | End: 2023-04-06 | Stop reason: HOSPADM

## 2023-04-04 RX ORDER — MONTELUKAST SODIUM 10 MG/1
10 TABLET ORAL NIGHTLY
Status: DISCONTINUED | OUTPATIENT
Start: 2023-04-05 | End: 2023-04-06 | Stop reason: HOSPADM

## 2023-04-04 RX ORDER — CLOPIDOGREL 300 MG/1
600 TABLET, FILM COATED ORAL ONCE
Status: DISCONTINUED | OUTPATIENT
Start: 2023-04-04 | End: 2023-04-06 | Stop reason: HOSPADM

## 2023-04-04 RX ORDER — ONDANSETRON 4 MG/1
4 TABLET, ORALLY DISINTEGRATING ORAL EVERY 8 HOURS PRN
Status: DISCONTINUED | OUTPATIENT
Start: 2023-04-04 | End: 2023-04-06 | Stop reason: HOSPADM

## 2023-04-04 RX ORDER — NITROGLYCERIN 0.4 MG/1
0.4 TABLET SUBLINGUAL EVERY 5 MIN PRN
Status: DISCONTINUED | OUTPATIENT
Start: 2023-04-04 | End: 2023-04-06 | Stop reason: HOSPADM

## 2023-04-04 RX ORDER — ARFORMOTEROL TARTRATE 15 UG/2ML
15 SOLUTION RESPIRATORY (INHALATION) 2 TIMES DAILY
Status: DISCONTINUED | OUTPATIENT
Start: 2023-04-04 | End: 2023-04-05

## 2023-04-04 RX ORDER — CLOPIDOGREL BISULFATE 75 MG/1
75 TABLET ORAL DAILY
Status: DISCONTINUED | OUTPATIENT
Start: 2023-04-05 | End: 2023-04-06 | Stop reason: HOSPADM

## 2023-04-04 RX ORDER — PANTOPRAZOLE SODIUM 40 MG/1
40 TABLET, DELAYED RELEASE ORAL DAILY
Status: DISCONTINUED | OUTPATIENT
Start: 2023-04-05 | End: 2023-04-06 | Stop reason: HOSPADM

## 2023-04-04 RX ORDER — ONDANSETRON 2 MG/ML
4 INJECTION INTRAMUSCULAR; INTRAVENOUS EVERY 6 HOURS PRN
Status: DISCONTINUED | OUTPATIENT
Start: 2023-04-04 | End: 2023-04-06 | Stop reason: HOSPADM

## 2023-04-04 RX ORDER — ENOXAPARIN SODIUM 100 MG/ML
1 INJECTION SUBCUTANEOUS 2 TIMES DAILY
Status: DISCONTINUED | OUTPATIENT
Start: 2023-04-04 | End: 2023-04-06 | Stop reason: HOSPADM

## 2023-04-04 RX ORDER — SODIUM CHLORIDE 0.9 % (FLUSH) 0.9 %
5-40 SYRINGE (ML) INJECTION PRN
Status: DISCONTINUED | OUTPATIENT
Start: 2023-04-04 | End: 2023-04-06 | Stop reason: HOSPADM

## 2023-04-04 RX ORDER — INSULIN LISPRO 100 [IU]/ML
0-4 INJECTION, SOLUTION INTRAVENOUS; SUBCUTANEOUS NIGHTLY
Status: DISCONTINUED | OUTPATIENT
Start: 2023-04-04 | End: 2023-04-06 | Stop reason: HOSPADM

## 2023-04-04 RX ORDER — INSULIN LISPRO 100 [IU]/ML
0-8 INJECTION, SOLUTION INTRAVENOUS; SUBCUTANEOUS
Status: DISCONTINUED | OUTPATIENT
Start: 2023-04-04 | End: 2023-04-06 | Stop reason: HOSPADM

## 2023-04-04 RX ORDER — DEXTROSE MONOHYDRATE 100 MG/ML
INJECTION, SOLUTION INTRAVENOUS CONTINUOUS PRN
Status: DISCONTINUED | OUTPATIENT
Start: 2023-04-04 | End: 2023-04-06 | Stop reason: HOSPADM

## 2023-04-04 RX ORDER — BUDESONIDE 0.5 MG/2ML
0.5 INHALANT ORAL 2 TIMES DAILY
Status: DISCONTINUED | OUTPATIENT
Start: 2023-04-04 | End: 2023-04-05

## 2023-04-04 RX ORDER — SODIUM CHLORIDE 9 MG/ML
INJECTION, SOLUTION INTRAVENOUS PRN
Status: DISCONTINUED | OUTPATIENT
Start: 2023-04-04 | End: 2023-04-06 | Stop reason: HOSPADM

## 2023-04-04 RX ORDER — SODIUM CHLORIDE 0.9 % (FLUSH) 0.9 %
5-40 SYRINGE (ML) INJECTION EVERY 12 HOURS SCHEDULED
Status: DISCONTINUED | OUTPATIENT
Start: 2023-04-04 | End: 2023-04-06 | Stop reason: HOSPADM

## 2023-04-04 RX ORDER — ACETAMINOPHEN 325 MG/1
650 TABLET ORAL EVERY 4 HOURS PRN
Status: DISCONTINUED | OUTPATIENT
Start: 2023-04-04 | End: 2023-04-06 | Stop reason: HOSPADM

## 2023-04-04 RX ADMIN — ARFORMOTEROL TARTRATE 15 MCG: 15 SOLUTION RESPIRATORY (INHALATION) at 19:59

## 2023-04-04 RX ADMIN — NITROGLYCERIN 0.5 INCH: 20 OINTMENT TOPICAL at 18:19

## 2023-04-04 RX ADMIN — ENOXAPARIN SODIUM 80 MG: 100 INJECTION SUBCUTANEOUS at 22:09

## 2023-04-04 RX ADMIN — Medication 10 ML: at 23:17

## 2023-04-04 RX ADMIN — SODIUM CHLORIDE: 900 INJECTION, SOLUTION INTRAVENOUS at 13:15

## 2023-04-04 RX ADMIN — BUDESONIDE 500 MCG: 0.5 SUSPENSION RESPIRATORY (INHALATION) at 19:59

## 2023-04-04 RX ADMIN — SODIUM CHLORIDE: 900 INJECTION, SOLUTION INTRAVENOUS at 23:15

## 2023-04-04 RX ADMIN — ATORVASTATIN CALCIUM 40 MG: 40 TABLET, FILM COATED ORAL at 22:09

## 2023-04-04 ASSESSMENT — PAIN DESCRIPTION - LOCATION: LOCATION: CHEST

## 2023-04-04 ASSESSMENT — LIFESTYLE VARIABLES: HOW MANY STANDARD DRINKS CONTAINING ALCOHOL DO YOU HAVE ON A TYPICAL DAY: PATIENT DOES NOT DRINK

## 2023-04-04 ASSESSMENT — PAIN SCALES - GENERAL: PAINLEVEL_OUTOF10: 7

## 2023-04-04 ASSESSMENT — PAIN - FUNCTIONAL ASSESSMENT: PAIN_FUNCTIONAL_ASSESSMENT: 0-10

## 2023-04-04 NOTE — H&P
Provider, MD   atenolol (TENORMIN) 25 MG tablet Take 25 mg by mouth daily    Historical Provider, MD   budesonide-formoterol (SYMBICORT) 160-4.5 MCG/ACT AERO Inhale 2 puffs into the lungs 2 times daily    Historical Provider, MD   albuterol sulfate  (90 Base) MCG/ACT inhaler Inhale 2 puffs into the lungs every 6 hours as needed for Wheezing    Historical Provider, MD   azelastine (ASTELIN) 0.1 % nasal spray 1 spray by Nasal route 2 times daily Use in each nostril as directed    Historical Provider, MD   alendronate (FOSAMAX) 70 MG tablet Take 70 mg by mouth every 7 days    Historical Provider, MD   lisinopril (PRINIVIL;ZESTRIL) 10 MG tablet Take 10 mg by mouth daily. Historical Provider, MD   Multiple Vitamins-Minerals (CENTRUM SILVER PO) Take  by mouth daily. Historical Provider, MD   Misc Natural Products (OSTEO BI-FLEX ADV JOINT SHIELD PO) Take  by mouth. Historical Provider, MD   Calcium Carbonate-Vitamin D (CALTRATE 600+D PO) Take  by mouth daily. Historical Provider, MD   metformin (GLUCOPHAGE) 1000 MG tablet Take 500 mg by mouth 2 times daily (with meals)    Historical Provider, MD   Montelukast Sodium (SINGULAIR PO) Take 10 mg by mouth    Historical Provider, MD   meloxicam (MOBIC) 15 MG tablet Take 15 mg by mouth daily. Historical Provider, MD   omeprazole (PRILOSEC) 20 MG capsule Take 20 mg by mouth daily.   Patient not taking: No sig reported    Historical Provider, MD   Tiotropium Bromide Monohydrate (SPIRIVA HANDIHALER IN) Inhale 18 mcg into the lungs  Patient not taking: Reported on 3/29/2023    Historical Provider, MD       ALLERGIES:  Noman Deed [aspirin], Pcn [penicillins], and Iodine    SOCIAL Hx:  Social History     Socioeconomic History    Marital status:      Spouse name: Not on file    Number of children: Not on file    Years of education: Not on file    Highest education level: Not on file   Occupational History    Not on file   Tobacco Use    Smoking status: Never

## 2023-04-04 NOTE — ED PROVIDER NOTES
Basophils Absolute 0.04 0.00 - 0.20 E9/L   Comprehensive Metabolic Panel w/ Reflex to MG   Result Value Ref Range    Sodium 137 132 - 146 mmol/L    Potassium reflex Magnesium 4.1 3.5 - 5.0 mmol/L    Chloride 100 98 - 107 mmol/L    CO2 25 22 - 29 mmol/L    Anion Gap 12 7 - 16 mmol/L    Glucose 91 74 - 99 mg/dL    BUN 32 (H) 6 - 23 mg/dL    Creatinine 1.0 0.5 - 1.0 mg/dL    Est, Glom Filt Rate 57 >=60 mL/min/1.73    Calcium 9.8 8.6 - 10.2 mg/dL    Total Protein 6.5 6.4 - 8.3 g/dL    Albumin 4.2 3.5 - 5.2 g/dL    Total Bilirubin 0.4 0.0 - 1.2 mg/dL    Alkaline Phosphatase 65 35 - 104 U/L    ALT 13 0 - 32 U/L    AST 22 0 - 31 U/L   Lipase   Result Value Ref Range    Lipase 40 13 - 60 U/L   Protime-INR   Result Value Ref Range    Protime 12.1 9.3 - 12.4 sec    INR 1.1    D-Dimer, Quantitative   Result Value Ref Range    D-Dimer, Quant <200 ng/mL DDU   Urinalysis   Result Value Ref Range    Color, UA Yellow Straw/Yellow    Clarity, UA Clear Clear    Glucose, Ur Negative Negative mg/dL    Bilirubin Urine Negative Negative    Ketones, Urine Negative Negative mg/dL    Specific Gravity, UA <=1.005 1.005 - 1.030    Blood, Urine Negative Negative    pH, UA 7.0 5.0 - 9.0    Protein, UA Negative Negative mg/dL    Urobilinogen, Urine 0.2 <2.0 E.U./dL    Nitrite, Urine Negative Negative    Leukocyte Esterase, Urine Negative Negative   Troponin   Result Value Ref Range    Troponin, High Sensitivity 16 (H) 0 - 9 ng/L   POCT Glucose   Result Value Ref Range    Glucose 90 mg/dL    QC OK? 90    POCT Glucose   Result Value Ref Range    Meter Glucose 90 74 - 99 mg/dL   EKG 12 Lead   Result Value Ref Range    Ventricular Rate 71 BPM    Atrial Rate 71 BPM    P-R Interval 164 ms    QRS Duration 78 ms    Q-T Interval 392 ms    QTc Calculation (Bazett) 425 ms    P Axis 87 degrees    R Axis 80 degrees    T Axis 11 degrees       RADIOLOGY:  Interpreted by Radiologist.  XR CHEST 1 VIEW   Final Result   No acute process.            EKG

## 2023-04-04 NOTE — ED NOTES
Charge RN made aware numerous times during pt visit that pt needed next room to be medicated and placed on monitor. None available at this time.       Isidro Harrington RN  04/04/23 5331

## 2023-04-05 LAB
ANION GAP SERPL CALCULATED.3IONS-SCNC: 9 MMOL/L (ref 7–16)
APTT BLD: 41.5 SEC (ref 24.5–35.1)
BASOPHILS # BLD: 0.04 E9/L (ref 0–0.2)
BASOPHILS NFR BLD: 0.7 % (ref 0–2)
BUN SERPL-MCNC: 27 MG/DL (ref 6–23)
CALCIUM SERPL-MCNC: 9 MG/DL (ref 8.6–10.2)
CHLORIDE SERPL-SCNC: 104 MMOL/L (ref 98–107)
CO2 SERPL-SCNC: 26 MMOL/L (ref 22–29)
CREAT SERPL-MCNC: 0.8 MG/DL (ref 0.5–1)
EKG ATRIAL RATE: 80 BPM
EKG P AXIS: 58 DEGREES
EKG P-R INTERVAL: 174 MS
EKG Q-T INTERVAL: 380 MS
EKG QRS DURATION: 90 MS
EKG QTC CALCULATION (BAZETT): 438 MS
EKG R AXIS: 58 DEGREES
EKG T AXIS: -7 DEGREES
EKG VENTRICULAR RATE: 80 BPM
EOSINOPHIL # BLD: 0.16 E9/L (ref 0.05–0.5)
EOSINOPHIL NFR BLD: 2.8 % (ref 0–6)
ERYTHROCYTE [DISTWIDTH] IN BLOOD BY AUTOMATED COUNT: 14.3 FL (ref 11.5–15)
GLUCOSE SERPL-MCNC: 90 MG/DL (ref 74–99)
HCT VFR BLD AUTO: 34.7 % (ref 34–48)
HGB BLD-MCNC: 11.2 G/DL (ref 11.5–15.5)
IMM GRANULOCYTES # BLD: 0.02 E9/L
IMM GRANULOCYTES NFR BLD: 0.3 % (ref 0–5)
INR BLD: 1.1
LEFT VENTRICULAR EJECTION FRACTION MODE: NORMAL
LV EF: 68 %
LV EF: 68 %
LVEF MODALITY: NORMAL
LYMPHOCYTES # BLD: 1.95 E9/L (ref 1.5–4)
LYMPHOCYTES NFR BLD: 33.9 % (ref 20–42)
MAGNESIUM SERPL-MCNC: 1.9 MG/DL (ref 1.6–2.6)
MCH RBC QN AUTO: 30.6 PG (ref 26–35)
MCHC RBC AUTO-ENTMCNC: 32.3 % (ref 32–34.5)
MCV RBC AUTO: 94.8 FL (ref 80–99.9)
METER GLUCOSE: 109 MG/DL (ref 74–99)
METER GLUCOSE: 114 MG/DL (ref 74–99)
METER GLUCOSE: 116 MG/DL (ref 74–99)
METER GLUCOSE: 132 MG/DL (ref 74–99)
MONOCYTES # BLD: 0.72 E9/L (ref 0.1–0.95)
MONOCYTES NFR BLD: 12.5 % (ref 2–12)
NEUTROPHILS # BLD: 2.86 E9/L (ref 1.8–7.3)
NEUTS SEG NFR BLD: 49.8 % (ref 43–80)
PHOSPHATE SERPL-MCNC: 3.3 MG/DL (ref 2.5–4.5)
PLATELET # BLD AUTO: 170 E9/L (ref 130–450)
PMV BLD AUTO: 11.1 FL (ref 7–12)
POTASSIUM SERPL-SCNC: 3.5 MMOL/L (ref 3.5–5)
PROTHROMBIN TIME: 13 SEC (ref 9.3–12.4)
RBC # BLD AUTO: 3.66 E12/L (ref 3.5–5.5)
SODIUM SERPL-SCNC: 139 MMOL/L (ref 132–146)
TROPONIN, HIGH SENSITIVITY: 16 NG/L (ref 0–9)
WBC # BLD: 5.8 E9/L (ref 4.5–11.5)

## 2023-04-05 PROCEDURE — 2580000003 HC RX 258: Performed by: EMERGENCY MEDICINE

## 2023-04-05 PROCEDURE — 6360000002 HC RX W HCPCS

## 2023-04-05 PROCEDURE — 96361 HYDRATE IV INFUSION ADD-ON: CPT

## 2023-04-05 PROCEDURE — G0378 HOSPITAL OBSERVATION PER HR: HCPCS

## 2023-04-05 PROCEDURE — 36415 COLL VENOUS BLD VENIPUNCTURE: CPT

## 2023-04-05 PROCEDURE — 83735 ASSAY OF MAGNESIUM: CPT

## 2023-04-05 PROCEDURE — 85610 PROTHROMBIN TIME: CPT

## 2023-04-05 PROCEDURE — 85025 COMPLETE CBC W/AUTO DIFF WBC: CPT

## 2023-04-05 PROCEDURE — 94640 AIRWAY INHALATION TREATMENT: CPT

## 2023-04-05 PROCEDURE — 85730 THROMBOPLASTIN TIME PARTIAL: CPT

## 2023-04-05 PROCEDURE — 6360000002 HC RX W HCPCS: Performed by: INTERNAL MEDICINE

## 2023-04-05 PROCEDURE — 93005 ELECTROCARDIOGRAM TRACING: CPT

## 2023-04-05 PROCEDURE — 6370000000 HC RX 637 (ALT 250 FOR IP): Performed by: EMERGENCY MEDICINE

## 2023-04-05 PROCEDURE — 96372 THER/PROPH/DIAG INJ SC/IM: CPT

## 2023-04-05 PROCEDURE — 93306 TTE W/DOPPLER COMPLETE: CPT

## 2023-04-05 PROCEDURE — 80048 BASIC METABOLIC PNL TOTAL CA: CPT

## 2023-04-05 PROCEDURE — 84100 ASSAY OF PHOSPHORUS: CPT

## 2023-04-05 PROCEDURE — 6370000000 HC RX 637 (ALT 250 FOR IP): Performed by: INTERNAL MEDICINE

## 2023-04-05 PROCEDURE — 6370000000 HC RX 637 (ALT 250 FOR IP)

## 2023-04-05 PROCEDURE — 84484 ASSAY OF TROPONIN QUANT: CPT

## 2023-04-05 PROCEDURE — 82962 GLUCOSE BLOOD TEST: CPT

## 2023-04-05 RX ORDER — BUDESONIDE 0.5 MG/2ML
0.5 INHALANT ORAL 2 TIMES DAILY
Status: DISCONTINUED | OUTPATIENT
Start: 2023-04-05 | End: 2023-04-06 | Stop reason: HOSPADM

## 2023-04-05 RX ORDER — BUDESONIDE AND FORMOTEROL FUMARATE DIHYDRATE 160; 4.5 UG/1; UG/1
2 AEROSOL RESPIRATORY (INHALATION) 2 TIMES DAILY
Status: DISCONTINUED | OUTPATIENT
Start: 2023-04-05 | End: 2023-04-05 | Stop reason: CLARIF

## 2023-04-05 RX ORDER — ATENOLOL 50 MG/1
25 TABLET ORAL DAILY
Status: DISCONTINUED | OUTPATIENT
Start: 2023-04-05 | End: 2023-04-06 | Stop reason: HOSPADM

## 2023-04-05 RX ORDER — ARFORMOTEROL TARTRATE 15 UG/2ML
15 SOLUTION RESPIRATORY (INHALATION) 2 TIMES DAILY
Status: DISCONTINUED | OUTPATIENT
Start: 2023-04-05 | End: 2023-04-06 | Stop reason: HOSPADM

## 2023-04-05 RX ADMIN — MONTELUKAST 10 MG: 10 TABLET, FILM COATED ORAL at 19:36

## 2023-04-05 RX ADMIN — Medication 10 ML: at 19:36

## 2023-04-05 RX ADMIN — CLOPIDOGREL BISULFATE 75 MG: 75 TABLET ORAL at 09:04

## 2023-04-05 RX ADMIN — ARFORMOTEROL TARTRATE 15 MCG: 15 SOLUTION RESPIRATORY (INHALATION) at 06:24

## 2023-04-05 RX ADMIN — BUDESONIDE 500 MCG: 0.5 SUSPENSION RESPIRATORY (INHALATION) at 17:06

## 2023-04-05 RX ADMIN — ACETAMINOPHEN 650 MG: 325 TABLET ORAL at 01:16

## 2023-04-05 RX ADMIN — BUMETANIDE 1 MG: 1 TABLET ORAL at 09:04

## 2023-04-05 RX ADMIN — METFORMIN HYDROCHLORIDE 500 MG: 500 TABLET, FILM COATED ORAL at 17:07

## 2023-04-05 RX ADMIN — BUDESONIDE 500 MCG: 0.5 SUSPENSION RESPIRATORY (INHALATION) at 06:24

## 2023-04-05 RX ADMIN — ARFORMOTEROL TARTRATE 15 MCG: 15 SOLUTION RESPIRATORY (INHALATION) at 17:06

## 2023-04-05 RX ADMIN — ATENOLOL 25 MG: 50 TABLET ORAL at 11:14

## 2023-04-05 RX ADMIN — ENOXAPARIN SODIUM 80 MG: 100 INJECTION SUBCUTANEOUS at 19:36

## 2023-04-05 RX ADMIN — PANTOPRAZOLE SODIUM 40 MG: 40 TABLET, DELAYED RELEASE ORAL at 09:03

## 2023-04-05 RX ADMIN — METFORMIN HYDROCHLORIDE 500 MG: 500 TABLET, FILM COATED ORAL at 11:14

## 2023-04-05 RX ADMIN — ENOXAPARIN SODIUM 80 MG: 100 INJECTION SUBCUTANEOUS at 09:04

## 2023-04-05 RX ADMIN — ATORVASTATIN CALCIUM 40 MG: 40 TABLET, FILM COATED ORAL at 09:04

## 2023-04-05 ASSESSMENT — PAIN DESCRIPTION - LOCATION: LOCATION: SHOULDER

## 2023-04-05 ASSESSMENT — PAIN - FUNCTIONAL ASSESSMENT: PAIN_FUNCTIONAL_ASSESSMENT: ACTIVITIES ARE NOT PREVENTED

## 2023-04-05 ASSESSMENT — PAIN SCALES - GENERAL
PAINLEVEL_OUTOF10: 5
PAINLEVEL_OUTOF10: 2

## 2023-04-05 ASSESSMENT — PAIN DESCRIPTION - DESCRIPTORS: DESCRIPTORS: ACHING;DISCOMFORT

## 2023-04-05 ASSESSMENT — PAIN DESCRIPTION - ORIENTATION: ORIENTATION: RIGHT

## 2023-04-05 NOTE — CARE COORDINATION
discharge: N/A            Potential DME:    Patient expects to discharge to: Unknown  Plan for transportation at discharge:      Financial    Payor: Johan Cabezas / Plan: Ana Lilia Sheets PPO / Product Type: Medicare /     Does insurance require precert for SNF: Yes    Potential assistance Purchasing Medications:    Meds-to-Beds request: Yes      [facility] [place] [address]  Phone: [phone] Fax: [phone]      Notes:    Factors facilitating achievement of predicted outcomes: Family support, Cooperative, and Good insight into deficits    Barriers to discharge: NONE    Additional Case Management Notes: 4/5/2023 1530 CM met with pt at the bedside for transition of care needs at d/c. Pt resides with her  and 2 adult sons in a 2 story house. Pt has a cane that she uses a walker and shower bench. Pta IADL's and ADL's , she drives but her  doesn't. Pt has a history of HHC but was many years ago, no history of SNF. No home going needs identified. PCP is Dr Darinel Yanez and uses [address] [facility]. Family can provide transportation home. CM will follow.  Electronically signed by Inga Rivera RN on 4/5/2023 at 3:51 PM                     Inga Rivera RN  Case Management Department

## 2023-04-05 NOTE — CONSULTS
Today's Date: 4/5/2023  Patient Name: Gay Silva  Date of admission: 4/4/2023 12:48 PM  Patient's age: [de-identified] y.o., 1945female    Admission Dx: Chest pain, unspecified type [R07.9]  Chest pain due to CAD [facility]) [I25.119]    Requesting Physician: Elian Weldon DO    Chief Complaint   Patient presents with    Chest Pain    Shortness of Breath    Dizziness     STARTED [id number]       HISTORY OF PRESENT ILLNESS:      4year-old pleasant lady known to me, has history of coronary disease, previous PCI, hypertension, obesity, type 2 diabetes, GERD, hypercholesterolemia and multiple other problems as below, presents to the hospital secondary to an episode of severe angina at rest 8/10 associated with diaphoresis and shortness of breath. She has been having increased angina with mild exertion for some time for which she did not seek medical attention. Her EKG showed no acute changes. Troponin I is mildly elevated. Review of systems:    Negative 10 systems review, except as mentioned above. Past Medical History: Coronary disease. ____ 02/20/20 PCI of circumflex and distal RCA ___ 05/05/16 Transradial catheterization revealed 80% proximal LAD; 40% mid circumflex; 50% mid-RCA; 50% proximal PLB of RCA and normal LV function. Patient had primary stenting of proximal LAD with the use of 3.0 x 38 mm Promus Premier drug-eluting stent with good results. ____ 3/2012 Echo, good LV function. _____ 8/2/10 Stress test was normal. <> 1/22/09 Treadmill nuclear stress test showed EF of 73%. Negative for ischemia. Small fixed perfusion defect involving the basal septum secondary to attenuation artifact. Echo showed borderline LVH, normal left and right ventricular systolic function, stage 1 diastolic dysfunction, a trace of MR and TR; and EF was greater than 60%. <> Nasal polyps for which she was going to have surgery. <> Hypercholesterolemia. <> Type 2 diabetes. <> Asthma. <> Obesity, <> Arthritis. <> Chronic hypertension.

## 2023-04-05 NOTE — PLAN OF CARE
Problem: Discharge Planning  Goal: Discharge to home or other facility with appropriate resources  Outcome: Progressing  Flowsheets (Taken 4/4/2023 2248)  Discharge to home or other facility with appropriate resources:   Identify barriers to discharge with patient and caregiver   Arrange for needed discharge resources and transportation as appropriate   Identify discharge learning needs (meds, wound care, etc)     Problem: Safety - Adult  Goal: Free from fall injury  Outcome: Progressing     Problem: ABCDS Injury Assessment  Goal: Absence of physical injury  Outcome: Progressing

## 2023-04-05 NOTE — ACP (ADVANCE CARE PLANNING)
Advance Care Planning   Healthcare Decision Maker:    Primary Decision Maker: Snow [phone] - [phone]

## 2023-04-05 NOTE — ED NOTES
Nurse to nurse called to [address] [facility], [address] [place] [address]  04/04/23 7595

## 2023-04-06 VITALS
TEMPERATURE: 96.8 F | WEIGHT: 201 LBS | SYSTOLIC BLOOD PRESSURE: 115 MMHG | OXYGEN SATURATION: 99 % | RESPIRATION RATE: 18 BRPM | DIASTOLIC BLOOD PRESSURE: 61 MMHG | BODY MASS INDEX: 34.31 KG/M2 | HEART RATE: 86 BPM | HEIGHT: 64 IN

## 2023-04-06 LAB
ANION GAP SERPL CALCULATED.3IONS-SCNC: 13 MMOL/L (ref 7–16)
BASOPHILS # BLD: 0.04 E9/L (ref 0–0.2)
BASOPHILS NFR BLD: 0.6 % (ref 0–2)
BUN SERPL-MCNC: 21 MG/DL (ref 6–23)
CALCIUM SERPL-MCNC: 9 MG/DL (ref 8.6–10.2)
CHLORIDE SERPL-SCNC: 105 MMOL/L (ref 98–107)
CO2 SERPL-SCNC: 23 MMOL/L (ref 22–29)
CREAT SERPL-MCNC: 0.8 MG/DL (ref 0.5–1)
EOSINOPHIL # BLD: 0.16 E9/L (ref 0.05–0.5)
EOSINOPHIL NFR BLD: 2.4 % (ref 0–6)
ERYTHROCYTE [DISTWIDTH] IN BLOOD BY AUTOMATED COUNT: 14.3 FL (ref 11.5–15)
GLUCOSE SERPL-MCNC: 91 MG/DL (ref 74–99)
HCT VFR BLD AUTO: 33.3 % (ref 34–48)
HGB BLD-MCNC: 10.6 G/DL (ref 11.5–15.5)
IMM GRANULOCYTES # BLD: 0.02 E9/L
IMM GRANULOCYTES NFR BLD: 0.3 % (ref 0–5)
LYMPHOCYTES # BLD: 2.29 E9/L (ref 1.5–4)
LYMPHOCYTES NFR BLD: 34.4 % (ref 20–42)
MAGNESIUM SERPL-MCNC: 1.9 MG/DL (ref 1.6–2.6)
MCH RBC QN AUTO: 30 PG (ref 26–35)
MCHC RBC AUTO-ENTMCNC: 31.8 % (ref 32–34.5)
MCV RBC AUTO: 94.3 FL (ref 80–99.9)
METER GLUCOSE: 91 MG/DL (ref 74–99)
MONOCYTES # BLD: 0.81 E9/L (ref 0.1–0.95)
MONOCYTES NFR BLD: 12.2 % (ref 2–12)
NEUTROPHILS # BLD: 3.34 E9/L (ref 1.8–7.3)
NEUTS SEG NFR BLD: 50.1 % (ref 43–80)
PHOSPHATE SERPL-MCNC: 2.8 MG/DL (ref 2.5–4.5)
PLATELET # BLD AUTO: 160 E9/L (ref 130–450)
PMV BLD AUTO: 10.7 FL (ref 7–12)
POTASSIUM SERPL-SCNC: 3.5 MMOL/L (ref 3.5–5)
RBC # BLD AUTO: 3.53 E12/L (ref 3.5–5.5)
SODIUM SERPL-SCNC: 141 MMOL/L (ref 132–146)
WBC # BLD: 6.7 E9/L (ref 4.5–11.5)

## 2023-04-06 PROCEDURE — 85025 COMPLETE CBC W/AUTO DIFF WBC: CPT

## 2023-04-06 PROCEDURE — 94640 AIRWAY INHALATION TREATMENT: CPT

## 2023-04-06 PROCEDURE — 83735 ASSAY OF MAGNESIUM: CPT

## 2023-04-06 PROCEDURE — 84100 ASSAY OF PHOSPHORUS: CPT

## 2023-04-06 PROCEDURE — 80048 BASIC METABOLIC PNL TOTAL CA: CPT

## 2023-04-06 PROCEDURE — 82962 GLUCOSE BLOOD TEST: CPT

## 2023-04-06 PROCEDURE — 6360000002 HC RX W HCPCS: Performed by: INTERNAL MEDICINE

## 2023-04-06 PROCEDURE — G0378 HOSPITAL OBSERVATION PER HR: HCPCS

## 2023-04-06 PROCEDURE — 36415 COLL VENOUS BLD VENIPUNCTURE: CPT

## 2023-04-06 RX ORDER — NITROGLYCERIN 0.4 MG/1
0.4 TABLET SUBLINGUAL EVERY 5 MIN PRN
Qty: 25 TABLET | Refills: 3 | COMMUNITY
Start: 2023-04-06

## 2023-04-06 RX ORDER — PANTOPRAZOLE SODIUM 40 MG/1
40 TABLET, DELAYED RELEASE ORAL DAILY
Qty: 30 TABLET | Refills: 3 | COMMUNITY
Start: 2023-04-06

## 2023-04-06 RX ADMIN — BUDESONIDE 500 MCG: 0.5 SUSPENSION RESPIRATORY (INHALATION) at 06:31

## 2023-04-06 RX ADMIN — ARFORMOTEROL TARTRATE 15 MCG: 15 SOLUTION RESPIRATORY (INHALATION) at 06:31

## 2023-04-06 NOTE — DISCHARGE INSTR - COC
QVIX:278877714}  Med Admin  {P DME JOIM:660192993}  Med Delivery   { [facility] MED Delivery:979292145}    Wound Care Documentation and Therapy:        Elimination:  Continence: Bowel: {YES / EL:55861}  Bladder: {YES / FJ:85140}  Urinary Catheter: {Urinary Catheter:588185746}   Colostomy/Ileostomy/Ileal Conduit: {YES / UR:68272}       Date of Last BM: ***    Intake/Output Summary (Last 24 hours) at 2023 0758  Last data filed at 2023 1343  Gross per 24 hour   Intake 2080.75 ml   Output 900 ml   Net 1180.75 ml     I/O last 3 completed shifts: In: .8 [P.O.:120;  I.V.:1960.8]  Out: 900 [Urine:900]    Safety Concerns:     [address] [facility] Safety Concerns:078241576}    Impairments/Disabilities:      [facility] Impairments/Disabilities:945221198}    Nutrition Therapy:  Current Nutrition Therapy:   [facility] Diet List:960642781}    Routes of Feeding: {[facility] DME Other Feedings:247231989}  Liquids: {Slp liquid thickness:77126}  Daily Fluid Restriction: {CHP DME Yes amt example:542903014}  Last Modified Barium Swallow with Video (Video Swallowing Test): {Done Not Done BWSD:190743047}    Treatments at the Time of Hospital Discharge:   Respiratory Treatments: ***  Oxygen Therapy:  {Therapy; copd oxygen:22967}  Ventilator:    { CC Vent CIAE:284441280}    Rehab Therapies: {THERAPEUTIC INTERVENTION:4597193858}  Weight Bearing Status/Restrictions: [employer] Weight Bearin}  Other Medical Equipment (for information only, NOT a DME order):  {EQUIPMENT:397598370}  Other Treatments: ***    Patient's personal belongings (please select all that are sent with patient):  {[facility] DME Belongings:561651962}    RN SIGNATURE:  {Esignature:073101804}    CASE MANAGEMENT/SOCIAL WORK SECTION    Inpatient Status Date: ***    Readmission Risk Assessment Score:  Readmission Risk              Risk of Unplanned Readmission:  0           Discharging to Facility/ Agency   Name:   Address:  Phone:  Fax:    Dialysis Facility (if applicable)

## 2023-04-06 NOTE — PROGRESS NOTES
N&N given to Daryl Carmona in cath  lab at [facility]    Historical Provider, MD   omeprazole (PRILOSEC) 20 MG capsule Take 20 mg by mouth daily. Patient not taking: Reported on 12/2/2022    Historical Provider, MD   Tiotropium Bromide Monohydrate (SPIRIVA HANDIHALER IN) Inhale 18 mcg into the lungs  Patient not taking: Reported on 3/29/2023    Historical Provider, MD       ALLERGIES:  Lionel London [aspirin], Pcn [penicillins], and Iodine    SOCIAL Hx:  Social History     Socioeconomic History    Marital status:      Spouse name: Not on file    Number of children: Not on file    Years of education: Not on file    Highest education level: Not on file   Occupational History    Not on file   Tobacco Use    Smoking status: Never    Smokeless tobacco: Never   Vaping Use    Vaping Use: Never used   Substance and Sexual Activity    Alcohol use: No    Drug use: No    Sexual activity: Not on file   Other Topics Concern    Not on file   Social History Narrative    Not on file     Social Determinants of Health     Financial Resource Strain: Not on file   Food Insecurity: Not on file   Transportation Needs: Not on file   Physical Activity: Not on file   Stress: Not on file   Social Connections: Not on file   Intimate Partner Violence: Not on file   Housing Stability: Not on file       ROS:  General:   Denies chills, fatigue, fever, malaise, night sweats or weight loss    Psychological:   Denies anxiety, disorientation or hallucinations    ENT:    Denies epistaxis, headaches, vertigo or visual changes    Cardiovascular:   Denies any irregular heartbeats, or palpitations. No paroxysmal nocturnal dyspnea. Reports chest pain    Respiratory:   Denies shortness of breath, coughing, sputum production, hemoptysis, or wheezing. No orthopnea. Gastrointestinal:   Denies nausea, vomiting, diarrhea, or constipation. Denies any abdominal pain. Denies change in bowel habits or stools. Genito-Urinary:    Denies any urgency, frequency, hematuria.   Voiding without

## 2023-04-06 NOTE — DISCHARGE SUMMARY
swelling    EXTREMITIES:    Peripheral pulses present. No edema, cyanosis, or swelling. OSTEOPATHIC:    Examined in seated and supine positions. Normal thoracic kyphosis and lumbar lordosis. No acute somatic dysfunction.     LABORATORY DATA:  CBC with Differential:    Lab Results   Component Value Date/Time    WBC 6.7 04/06/2023 06:46 AM    RBC 3.53 04/06/2023 06:46 AM    HGB 10.6 04/06/2023 06:46 AM    HCT 33.3 04/06/2023 06:46 AM     04/06/2023 06:46 AM    MCV 94.3 04/06/2023 06:46 AM    MCH 30.0 04/06/2023 06:46 AM    MCHC 31.8 04/06/2023 06:46 AM    RDW 14.3 04/06/2023 06:46 AM    LYMPHOPCT 34.4 04/06/2023 06:46 AM    MONOPCT 12.2 04/06/2023 06:46 AM    BASOPCT 0.6 04/06/2023 06:46 AM    MONOSABS 0.81 04/06/2023 06:46 AM    LYMPHSABS 2.29 04/06/2023 06:46 AM    EOSABS 0.16 04/06/2023 06:46 AM    BASOSABS 0.04 04/06/2023 06:46 AM     CMP:    Lab Results   Component Value Date/Time     04/05/2023 05:11 AM    K 3.5 04/05/2023 05:11 AM    K 4.1 04/04/2023 01:23 PM     04/05/2023 05:11 AM    CO2 26 04/05/2023 05:11 AM    BUN 27 04/05/2023 05:11 AM    CREATININE 0.8 04/05/2023 05:11 AM    GFRAA >60 03/11/2022 11:12 AM    LABGLOM >60 04/05/2023 05:11 AM    GLUCOSE 90 04/05/2023 05:11 AM    PROT 6.5 04/04/2023 01:23 PM    LABALBU 4.2 04/04/2023 01:23 PM    CALCIUM 9.0 04/05/2023 05:11 AM    BILITOT 0.4 04/04/2023 01:23 PM    ALKPHOS 65 04/04/2023 01:23 PM    AST 22 04/04/2023 01:23 PM    ALT 13 04/04/2023 01:23 PM     Hepatic Function Panel:    Lab Results   Component Value Date/Time    ALKPHOS 65 04/04/2023 01:23 PM    ALT 13 04/04/2023 01:23 PM    AST 22 04/04/2023 01:23 PM    PROT 6.5 04/04/2023 01:23 PM    BILITOT 0.4 04/04/2023 01:23 PM    BILIDIR <0.2 08/03/2021 09:35 AM    IBILI see below 08/03/2021 09:35 AM    LABALBU 4.2 04/04/2023 01:23 PM     Magnesium:    Lab Results   Component Value Date/Time    MG 1.9 04/05/2023 05:11 AM     Phosphorus:    Lab Results   Component Value Date/Time

## 2023-04-06 NOTE — PLAN OF CARE
Problem: Discharge Planning  Goal: Discharge to home or other facility with appropriate resources  Outcome: Adequate for Discharge  Flowsheets (Taken 4/5/2023 1930 by Doyle Sethi, RN)  Discharge to home or other facility with appropriate resources:   Identify barriers to discharge with patient and caregiver   Arrange for needed discharge resources and transportation as appropriate   Identify discharge learning needs (meds, wound care, etc)     Problem: Safety - Adult  Goal: Free from fall injury  Outcome: Adequate for Discharge  Flowsheets (Taken 4/5/2023 1930 by Doyle Sethi, RN)  Free From Fall Injury: Instruct family/caregiver on patient safety     Problem: ABCDS Injury Assessment  Goal: Absence of physical injury  Outcome: Adequate for Discharge  Flowsheets (Taken 4/5/2023 1930 by Doyle Sethi, RN)  Absence of Physical Injury: Implement safety measures based on patient assessment

## 2023-04-25 ENCOUNTER — HOSPITAL ENCOUNTER (OUTPATIENT)
Dept: OTHER | Age: 78
Setting detail: THERAPIES SERIES
Discharge: HOME | End: 2023-04-25
Payer: MEDICARE

## 2023-04-25 VITALS
SYSTOLIC BLOOD PRESSURE: 114 MMHG | HEART RATE: 72 BPM | WEIGHT: 196.2 LBS | DIASTOLIC BLOOD PRESSURE: 50 MMHG | RESPIRATION RATE: 18 BRPM | BODY MASS INDEX: 33.68 KG/M2 | OXYGEN SATURATION: 99 %

## 2023-04-25 LAB
ANION GAP SERPL CALCULATED.3IONS-SCNC: 11 MMOL/L (ref 7–16)
BNP BLD-MCNC: 1084 PG/ML (ref 0–450)
BUN SERPL-MCNC: 27 MG/DL (ref 6–23)
CALCIUM SERPL-MCNC: 9.3 MG/DL (ref 8.6–10.2)
CHLORIDE SERPL-SCNC: 100 MMOL/L (ref 98–107)
CO2 SERPL-SCNC: 26 MMOL/L (ref 22–29)
CREAT SERPL-MCNC: 1 MG/DL (ref 0.5–1)
GLUCOSE SERPL-MCNC: 97 MG/DL (ref 74–99)
POTASSIUM SERPL-SCNC: 4.5 MMOL/L (ref 3.5–5)
SODIUM SERPL-SCNC: 137 MMOL/L (ref 132–146)

## 2023-04-25 PROCEDURE — 83880 ASSAY OF NATRIURETIC PEPTIDE: CPT

## 2023-04-25 PROCEDURE — 80048 BASIC METABOLIC PNL TOTAL CA: CPT

## 2023-04-25 PROCEDURE — 36415 COLL VENOUS BLD VENIPUNCTURE: CPT

## 2023-04-25 PROCEDURE — 99214 OFFICE O/P EST MOD 30 MIN: CPT

## 2023-04-25 RX ORDER — BUMETANIDE 1 MG/1
1 TABLET ORAL DAILY
COMMUNITY

## 2023-04-25 NOTE — PROGRESS NOTES
SpO2 99%   BMI 33.68 kg/m²     Assessment  Charting Type: Shift assessment    Neurological  Level of Consciousness: Alert (0)    Respiratory  Respiratory Pattern: Regular  Respiratory Depth: Normal  Respiratory Quality/Effort: Unlabored  Chest Assessment: Chest expansion symmetrical  L Breath Sounds: Clear  R Breath Sounds: Clear      Cardiac  Cardiac Regularity: Regular  Heart Sounds: S1, S2  Cardiac Rhythm: Sinus rhythm    Rhythm Interpretation  Heart Rate: 72    Gastrointestinal  Abdominal (WDL): Within Defined Limits  Abdomen Inspection: Rounded, Soft  GI Symptoms: Other (Comment) (None)     Peripheral Vascular  Peripheral Vascular (WDL): Within Defined Limits    Genitourinary  Genitourinary (WDL): Within Defined Limits  Suprapubic Tenderness: No  Dysuria (Pain/Burning w/Urination): No    Psychosocial  Psychosocial (WDL): Within Defined Limits    Heart Rate: 72      LAB DATA:    Last 3 BMP      Sodium (mmol/L)   Date Value   04/25/2023 137   04/17/2023 143   04/06/2023 141     Potassium (mmol/L)   Date Value   04/25/2023 4.5   04/17/2023 4.3   04/06/2023 3.5     Potassium reflex Magnesium (mmol/L)   Date Value   04/04/2023 4.1   10/26/2022 3.8     Chloride (mmol/L)   Date Value   04/25/2023 100   04/17/2023 101   04/06/2023 105     CO2 (mmol/L)   Date Value   04/25/2023 26   04/17/2023 24   04/06/2023 23     BUN (mg/dL)   Date Value   04/25/2023 27 (H)   04/17/2023 25 (H)   04/06/2023 21     Glucose (mg/dL)   Date Value   04/25/2023 97   04/17/2023 87   04/06/2023 91     Calcium (mg/dL)   Date Value   04/25/2023 9.3   04/17/2023 9.7   04/06/2023 9.0       Last 3 BNP       Pro-BNP (pg/mL)   Date Value   04/25/2023 1,084 (H)   03/29/2023 1,451 (H)   02/27/2023 1,234 (H)          CBC: No results for input(s): WBC, HGB, PLT in the last 72 hours.   BMP:    Recent Labs     04/25/23  1040      K 4.5      CO2 26   BUN 27*   CREATININE 1.0   GLUCOSE 97         Hepatic: No results for input(s): AST, ALT, ALB,

## 2023-08-23 ENCOUNTER — HOSPITAL ENCOUNTER (OUTPATIENT)
Dept: OTHER | Age: 78
Setting detail: THERAPIES SERIES
Discharge: HOME | End: 2023-08-23

## 2023-09-08 ENCOUNTER — HOSPITAL ENCOUNTER (OUTPATIENT)
Dept: OTHER | Age: 78
Setting detail: THERAPIES SERIES
Discharge: HOME | End: 2023-09-08
Payer: MEDICARE

## 2023-09-08 VITALS
BODY MASS INDEX: 32.62 KG/M2 | SYSTOLIC BLOOD PRESSURE: 108 MMHG | HEART RATE: 68 BPM | OXYGEN SATURATION: 98 % | WEIGHT: 190.04 LBS | RESPIRATION RATE: 18 BRPM | DIASTOLIC BLOOD PRESSURE: 58 MMHG

## 2023-09-08 LAB
ANION GAP SERPL CALCULATED.3IONS-SCNC: 11 MMOL/L (ref 7–16)
BNP SERPL-MCNC: 273 PG/ML (ref 0–450)
BUN SERPL-MCNC: 28 MG/DL (ref 6–23)
CALCIUM SERPL-MCNC: 9.5 MG/DL (ref 8.6–10.2)
CHLORIDE SERPL-SCNC: 102 MMOL/L (ref 98–107)
CO2 SERPL-SCNC: 25 MMOL/L (ref 22–29)
CREAT SERPL-MCNC: 0.9 MG/DL (ref 0.5–1)
GFR SERPL CREATININE-BSD FRML MDRD: >60 ML/MIN/1.73M2
GLUCOSE SERPL-MCNC: 105 MG/DL (ref 74–99)
POTASSIUM SERPL-SCNC: 5 MMOL/L (ref 3.5–5)
SODIUM SERPL-SCNC: 138 MMOL/L (ref 132–146)

## 2023-09-08 PROCEDURE — 83880 ASSAY OF NATRIURETIC PEPTIDE: CPT

## 2023-09-08 PROCEDURE — 99214 OFFICE O/P EST MOD 30 MIN: CPT

## 2023-09-08 PROCEDURE — 80048 BASIC METABOLIC PNL TOTAL CA: CPT

## 2023-09-08 PROCEDURE — 36415 COLL VENOUS BLD VENIPUNCTURE: CPT

## 2023-09-08 ASSESSMENT — PATIENT HEALTH QUESTIONNAIRE - PHQ9
SUM OF ALL RESPONSES TO PHQ9 QUESTIONS 1 & 2: 0
1. LITTLE INTEREST OR PLEASURE IN DOING THINGS: NOT AT ALL
2. FEELING DOWN, DEPRESSED OR HOPELESS: NOT AT ALL

## 2023-09-08 NOTE — PROGRESS NOTES
minutes as needed for Chest pain up to max of 3 total doses. If no relief after 1 dose, call 911. (Patient not taking: Reported on 9/8/2023), Disp: 25 tablet, Rfl: 3    pioglitazone (ACTOS) 30 MG tablet, Take 1.5 tablets by mouth daily (Patient not taking: Reported on 9/8/2023), Disp: , Rfl:     clopidogrel (PLAVIX) 75 MG tablet, Take 1 tablet by mouth daily, Disp: , Rfl:     atorvastatin (LIPITOR) 10 MG tablet, Take 4 tablets by mouth daily, Disp: , Rfl:     atenolol (TENORMIN) 25 MG tablet, Take 1 tablet by mouth daily, Disp: , Rfl:     budesonide-formoterol (SYMBICORT) 160-4.5 MCG/ACT AERO, Inhale 2 puffs into the lungs 2 times daily (Patient not taking: Reported on 9/8/2023), Disp: , Rfl:     albuterol sulfate  (90 Base) MCG/ACT inhaler, Inhale 2 puffs into the lungs every 6 hours as needed for Wheezing (Patient not taking: Reported on 9/8/2023), Disp: , Rfl:     azelastine (ASTELIN) 0.1 % nasal spray, 1 spray by Nasal route 2 times daily Use in each nostril as directed, Disp: , Rfl:     alendronate (FOSAMAX) 70 MG tablet, Take 1 tablet by mouth every 7 days, Disp: , Rfl:     lisinopril (PRINIVIL;ZESTRIL) 10 MG tablet, Take 1 tablet by mouth daily, Disp: , Rfl:     Multiple Vitamins-Minerals (CENTRUM SILVER PO), Take  by mouth daily. , Disp: , Rfl:     Misc Natural Products (OSTEO BI-FLEX ADV JOINT SHIELD PO), Take  by mouth., Disp: , Rfl:     Calcium Carbonate-Vitamin D (CALTRATE 600+D PO), Take  by mouth daily. , Disp: , Rfl:     Montelukast Sodium (SINGULAIR PO), Take 10 mg by mouth, Disp: , Rfl:     meloxicam (MOBIC) 15 MG tablet, Take 1 tablet by mouth daily, Disp: , Rfl:     Tiotropium Bromide Monohydrate (SPIRIVA HANDIHALER IN), Inhale 18 mcg into the lungs (Patient not taking: Reported on 3/29/2023), Disp: , Rfl:        [facility] THERAPY for HFpEF:  SGLT2i:  (2a indication)  No  Aldosterone antagonist: (2b indication)  No  ARNI/ACE I/ARB: (2b indication, with LVEF on lower end of

## 2023-09-08 NOTE — FLOWSHEET NOTE
09/08/23 1030   Over the past 2 weeks, how often have you been bothered by any of the following problems?    Little interest or pleasure in doing things Not at all   Feeling down, depressed, or hopeless Not at all   Patient Health Questionnaire-2 Score 0

## 2023-11-07 ENCOUNTER — HOSPITAL ENCOUNTER (OUTPATIENT)
Dept: OTHER | Age: 78
Setting detail: THERAPIES SERIES
Discharge: HOME | End: 2023-11-07
Payer: MEDICARE

## 2023-11-07 VITALS
DIASTOLIC BLOOD PRESSURE: 63 MMHG | OXYGEN SATURATION: 96 % | WEIGHT: 195 LBS | RESPIRATION RATE: 18 BRPM | BODY MASS INDEX: 33.47 KG/M2 | HEART RATE: 68 BPM | SYSTOLIC BLOOD PRESSURE: 129 MMHG

## 2023-11-07 LAB
ANION GAP SERPL CALCULATED.3IONS-SCNC: 11 MMOL/L (ref 7–16)
BNP SERPL-MCNC: 344 PG/ML (ref 0–450)
BUN SERPL-MCNC: 24 MG/DL (ref 6–23)
CALCIUM SERPL-MCNC: 9.5 MG/DL (ref 8.6–10.2)
CHLORIDE SERPL-SCNC: 105 MMOL/L (ref 98–107)
CO2 SERPL-SCNC: 25 MMOL/L (ref 22–29)
CREAT SERPL-MCNC: 0.8 MG/DL (ref 0.5–1)
GFR SERPL CREATININE-BSD FRML MDRD: >60 ML/MIN/1.73M2
GLUCOSE SERPL-MCNC: 100 MG/DL (ref 74–99)
POTASSIUM SERPL-SCNC: 4.2 MMOL/L (ref 3.5–5)
SODIUM SERPL-SCNC: 141 MMOL/L (ref 132–146)

## 2023-11-07 PROCEDURE — 83880 ASSAY OF NATRIURETIC PEPTIDE: CPT

## 2023-11-07 PROCEDURE — 80048 BASIC METABOLIC PNL TOTAL CA: CPT

## 2023-11-07 PROCEDURE — 36415 COLL VENOUS BLD VENIPUNCTURE: CPT

## 2023-11-07 PROCEDURE — 99214 OFFICE O/P EST MOD 30 MIN: CPT

## 2023-11-07 NOTE — PROGRESS NOTES
spectrum)  Lisinopril      RISK SCORES:    [] Palliative care consulted    Predictive Model Details          11%  Factor Value    End of Life Care Index Model 47% Age 79 y/o     12% Has Chronic Ulcer of Skin Yes     10% Prescribed Corticosteroid Yes     6% Has COPD or Bronchiectasis Yes     6% Has Fluid or Electrolyte Disorder Yes     6% Prescribed Antiasthmatic Yes     4% Last RDW Janis 13.9 fL     3% Last Albumin Janis 4.2 g/dL     3% Prescribed Antidiabetic Yes     2% Prescribed Diuretic Yes     2% Has Coronary Atherosclerosis or Other Heart Disease Yes             HEART FAILURE FOCUSED PHYSICAL EXAMINATION:    Vitals:    11/07/23 1130   BP: 129/63   Pulse: 68   Resp: 18   SpO2: 96%          Assessment  Charting Type: Shift assessment  Neurological  Level of Consciousness: Alert (0)     HEENT (Head, Ears, Eyes, Nose, & Throat)  HEENT (WDL): Exceptions to WDL  Right Eye: Glasses  Left Eye: Glasses  Respiratory  Respiratory Pattern: Regular  Respiratory Depth: Normal  Respiratory Quality/Effort: Unlabored  Chest Assessment: Chest expansion symmetrical  L Breath Sounds: Clear  R Breath Sounds: Clear        Cardiac  Cardiac Regularity: Regular  Heart Sounds: S1, S2  Cardiac Rhythm: Sinus rhythm  Rhythm Interpretation  Pulse: 68     Gastrointestinal  Abdominal (WDL): Within Defined Limits         Peripheral Vascular  Peripheral Vascular (WDL): Within Defined Limits           Genitourinary  Genitourinary (WDL): Within Defined Limits  Suprapubic Tenderness: No  Dysuria (Pain/Burning w/Urination): No  Psychosocial  Psychosocial (WDL): Within Defined Limits              Pulse: 68                 LAB DATA:  BMP:  Sodium (mmol/L)   Date Value   11/07/2023 141   09/08/2023 138   06/16/2023 136     Potassium (mmol/L)   Date Value   11/07/2023 4.2   09/08/2023 5.0   06/16/2023 4.3     Potassium reflex Magnesium (mmol/L)   Date Value   04/04/2023 4.1   10/26/2022 3.8     Chloride (mmol/L)   Date Value   11/07/2023 105   09/08/2023

## 2024-02-29 ENCOUNTER — HOSPITAL ENCOUNTER (OUTPATIENT)
Dept: OTHER | Age: 79
Setting detail: THERAPIES SERIES
Discharge: HOME | End: 2024-02-29
Payer: MEDICARE

## 2024-02-29 VITALS
RESPIRATION RATE: 18 BRPM | SYSTOLIC BLOOD PRESSURE: 120 MMHG | HEART RATE: 70 BPM | BODY MASS INDEX: 33.44 KG/M2 | DIASTOLIC BLOOD PRESSURE: 67 MMHG | OXYGEN SATURATION: 96 % | WEIGHT: 194.8 LBS

## 2024-02-29 LAB
ANION GAP SERPL CALCULATED.3IONS-SCNC: 12 MMOL/L (ref 7–16)
BNP SERPL-MCNC: 219 PG/ML (ref 0–450)
BUN SERPL-MCNC: 29 MG/DL (ref 6–23)
CALCIUM SERPL-MCNC: 9.4 MG/DL (ref 8.6–10.2)
CHLORIDE SERPL-SCNC: 101 MMOL/L (ref 98–107)
CO2 SERPL-SCNC: 22 MMOL/L (ref 22–29)
CREAT SERPL-MCNC: 1 MG/DL (ref 0.5–1)
GFR SERPL CREATININE-BSD FRML MDRD: 58 ML/MIN/1.73M2
GLUCOSE SERPL-MCNC: 127 MG/DL (ref 74–99)
POTASSIUM SERPL-SCNC: 4 MMOL/L (ref 3.5–5)
SODIUM SERPL-SCNC: 135 MMOL/L (ref 132–146)

## 2024-02-29 PROCEDURE — 99214 OFFICE O/P EST MOD 30 MIN: CPT

## 2024-02-29 PROCEDURE — 80048 BASIC METABOLIC PNL TOTAL CA: CPT

## 2024-02-29 PROCEDURE — 36415 COLL VENOUS BLD VENIPUNCTURE: CPT

## 2024-02-29 PROCEDURE — 83880 ASSAY OF NATRIURETIC PEPTIDE: CPT

## 2024-02-29 NOTE — PROGRESS NOTES
105   09/08/2023 102     CO2 (mmol/L)   Date Value   02/29/2024 22   11/07/2023 25   09/08/2023 25     BUN (mg/dL)   Date Value   02/29/2024 29 (H)   11/07/2023 24 (H)   09/08/2023 28 (H)     Creatinine (mg/dL)   Date Value   02/29/2024 1.0   11/07/2023 0.8   09/08/2023 0.9     Glucose (mg/dL)   Date Value   02/29/2024 127 (H)   11/07/2023 100 (H)   09/08/2023 105 (H)     Calcium (mg/dL)   Date Value   02/29/2024 9.4   11/07/2023 9.5   09/08/2023 9.5     BNP:  Pro-BNP (pg/mL)   Date Value   02/29/2024 219   11/07/2023 344   09/08/2023 273      CBC:  WBC (E9/L)   Date Value   04/17/2023 6.2     Hemoglobin (g/dL)   Date Value   04/17/2023 12.5     Hematocrit (%)   Date Value   04/17/2023 39.1     Platelets (E9/L)   Date Value   04/17/2023 227     Iron Studies:  No results found for: \"FERRITIN\", \"IRON\", \"TIBC\"  Hepatic:  AST (U/L)   Date Value   04/04/2023 22     ALT (U/L)   Date Value   04/04/2023 13     Total Bilirubin (mg/dL)   Date Value   04/04/2023 0.4     Alkaline Phosphatase (U/L)   Date Value   04/04/2023 65     INR:  INR (no units)   Date Value   04/05/2023 1.1         Wt Readings from Last 3 Encounters:   02/29/24 88.4 kg (194 lb 12.8 oz)   11/07/23 88.5 kg (195 lb)   09/08/23 86.2 kg (190 lb 0.6 oz)           ASSESSMENT/PLAN:    [x] Euvolemic           [] Hypervolemic, with increased from baseline:  [] Shortness of breath/NIETO  [] JVD  [] HJR  [] Abnormal lung assessment:   [] Orthopnea  [] PND  [] Decreased urinary response to oral diuretic   [] Scrotal swelling   [] Lower extremity edema  [] Compression stockings provided  [] Decline in functional capacity (unable to perform activities they had previously been able to do)  [] Weight gain   [] IV diuretics given No  [] Provider notified of recurrent IV diuretic use    [x]Lab work obtained    [x] Patient/Family Educated On:  [] HF zones (Green, Yellow, Red) and aware of when to take action   [x] Daily weights  [] Scale provided   [x] Low sodium diet (2000

## 2024-05-17 ENCOUNTER — HOSPITAL ENCOUNTER (OUTPATIENT)
Dept: OTHER | Age: 79
Setting detail: THERAPIES SERIES
Discharge: HOME | End: 2024-05-17
Payer: MEDICARE

## 2024-05-17 VITALS
SYSTOLIC BLOOD PRESSURE: 103 MMHG | OXYGEN SATURATION: 97 % | BODY MASS INDEX: 33.65 KG/M2 | DIASTOLIC BLOOD PRESSURE: 58 MMHG | WEIGHT: 196.03 LBS | RESPIRATION RATE: 16 BRPM | HEART RATE: 69 BPM

## 2024-05-17 LAB
ANION GAP SERPL CALCULATED.3IONS-SCNC: 12 MMOL/L (ref 7–16)
BNP SERPL-MCNC: 139 PG/ML (ref 0–450)
BUN SERPL-MCNC: 31 MG/DL (ref 6–23)
CALCIUM SERPL-MCNC: 10 MG/DL (ref 8.6–10.2)
CHLORIDE SERPL-SCNC: 101 MMOL/L (ref 98–107)
CO2 SERPL-SCNC: 25 MMOL/L (ref 22–29)
CREAT SERPL-MCNC: 0.9 MG/DL (ref 0.5–1)
GFR, ESTIMATED: 63 ML/MIN/1.73M2
GLUCOSE SERPL-MCNC: 126 MG/DL (ref 74–99)
POTASSIUM SERPL-SCNC: 4.6 MMOL/L (ref 3.5–5)
SODIUM SERPL-SCNC: 138 MMOL/L (ref 132–146)

## 2024-05-17 PROCEDURE — 80048 BASIC METABOLIC PNL TOTAL CA: CPT

## 2024-05-17 PROCEDURE — 83880 ASSAY OF NATRIURETIC PEPTIDE: CPT

## 2024-05-17 PROCEDURE — 99214 OFFICE O/P EST MOD 30 MIN: CPT

## 2024-05-17 PROCEDURE — 36415 COLL VENOUS BLD VENIPUNCTURE: CPT

## 2024-05-17 RX ORDER — FLUTICASONE PROPIONATE AND SALMETEROL XINAFOATE 230; 21 UG/1; UG/1
2 AEROSOL, METERED RESPIRATORY (INHALATION) 2 TIMES DAILY
COMMUNITY

## 2024-05-17 ASSESSMENT — PATIENT HEALTH QUESTIONNAIRE - PHQ9
SUM OF ALL RESPONSES TO PHQ QUESTIONS 1-9: 1
SUM OF ALL RESPONSES TO PHQ9 QUESTIONS 1 & 2: 1
2. FEELING DOWN, DEPRESSED OR HOPELESS: SEVERAL DAYS
1. LITTLE INTEREST OR PLEASURE IN DOING THINGS: NOT AT ALL
SUM OF ALL RESPONSES TO PHQ QUESTIONS 1-9: 1

## 2024-05-17 NOTE — PROGRESS NOTES
Congestive Heart Failure Clinic   [facility]       Referring Provider: DR. Antonio  Primary Care Physician: Titus Bennett DO   Cardiologist: Dr. Antonio   Nephrologist:       HISTORY OF PRESENT ILLNESS:     Juliana Mckeon is a 79 y.o. (1945) female with a history of HFpEF, most recent EF:  Lab Results   Component Value Date    LVEF 68 04/05/2023     She presents to the CHF clinic for ongoing evaluation and monitoring of heart failure.  In the CHF clinic today she denies any adverse symptoms except:  [] Dizziness or lightheadedness   [] Syncope or near syncope  [] Recent Fall  [] Shortness of breath at rest   [] Dyspnea with exertion  [] Decline in functional capacity (unable to perform activities they had previously been able to do)  [] Fatigue   [] Orthopnea  [] PND  [] Nocturnal cough  [] Hemoptysis  [] Chest pain, pressure, or discomfort  [] Palpitations  [] Abdominal distention  [] Abdominal bloating  [] Early satiety  [] Blood in stool   [] Diarrhea  [] Constipation  [] Nausea/Vomiting  [] Decreased urinary response to oral diuretic   [] Scrotal swelling   [] Lower extremity edema  [] Used PRN doses of oral diuretic   [] Weight gain    Wt Readings from Last 3 Encounters:   05/17/24 88.9 kg (196 lb 0.4 oz)   02/29/24 88.4 kg (194 lb 12.8 oz)   11/07/23 88.5 kg (195 lb)       SOCIAL HISTORY:  [] Denies tobacco, alcohol or illicit drug abuse  [] Tobacco use:  [] ETOH use:  [] Illicit drug use:        MEDICATIONS:    Allergies   Allergen Reactions    Asa [Aspirin] Shortness Of Breath    Pcn [Penicillins] Swelling    Iodine Rash       Current Outpatient Medications:     fluticasone-salmeterol (ADVAIR HFA) 230-21 MCG/ACT inhaler, Inhale 2 puffs into the lungs 2 times daily, Disp: , Rfl:     metFORMIN (GLUCOPHAGE) 500 MG tablet, Take 1 tablet by mouth 2 times daily (with meals), Disp: , Rfl:     bumetanide (BUMEX) 1 MG tablet, Take 1 tablet by mouth daily Per patient

## 2024-06-28 ENCOUNTER — APPOINTMENT (OUTPATIENT)
Dept: CT IMAGING | Age: 79
End: 2024-06-28
Payer: MEDICARE

## 2024-06-28 ENCOUNTER — HOSPITAL ENCOUNTER (EMERGENCY)
Age: 79
Discharge: HOME | End: 2024-06-28
Attending: EMERGENCY MEDICINE
Payer: MEDICARE

## 2024-06-28 VITALS
TEMPERATURE: 97.7 F | HEIGHT: 63 IN | BODY MASS INDEX: 32.78 KG/M2 | HEART RATE: 72 BPM | SYSTOLIC BLOOD PRESSURE: 146 MMHG | WEIGHT: 185 LBS | OXYGEN SATURATION: 96 % | DIASTOLIC BLOOD PRESSURE: 81 MMHG | RESPIRATION RATE: 18 BRPM

## 2024-06-28 DIAGNOSIS — M54.32 LEFT SIDED SCIATICA: Primary | ICD-10-CM

## 2024-06-28 PROCEDURE — 72131 CT LUMBAR SPINE W/O DYE: CPT

## 2024-06-28 PROCEDURE — 99284 EMERGENCY DEPT VISIT MOD MDM: CPT

## 2024-06-28 PROCEDURE — 6370000000 HC RX 637 (ALT 250 FOR IP)

## 2024-06-28 RX ORDER — LIDOCAINE 4 G/G
1 PATCH TOPICAL DAILY
Status: DISCONTINUED | OUTPATIENT
Start: 2024-06-28 | End: 2024-06-28 | Stop reason: HOSPADM

## 2024-06-28 RX ORDER — METHOCARBAMOL 750 MG/1
750 TABLET, FILM COATED ORAL 4 TIMES DAILY
Qty: 40 TABLET | Refills: 0 | Status: SHIPPED | OUTPATIENT
Start: 2024-06-28 | End: 2024-07-08

## 2024-06-28 RX ORDER — ACETAMINOPHEN 500 MG
500 TABLET ORAL 4 TIMES DAILY PRN
Qty: 120 TABLET | Refills: 0 | Status: SHIPPED | OUTPATIENT
Start: 2024-06-28

## 2024-06-28 RX ORDER — OXYCODONE HYDROCHLORIDE AND ACETAMINOPHEN 5; 325 MG/1; MG/1
1 TABLET ORAL ONCE
Status: COMPLETED | OUTPATIENT
Start: 2024-06-28 | End: 2024-06-28

## 2024-06-28 RX ORDER — LIDOCAINE 50 MG/G
1 PATCH TOPICAL DAILY
Qty: 10 PATCH | Refills: 0 | Status: SHIPPED | OUTPATIENT
Start: 2024-06-28 | End: 2024-07-08

## 2024-06-28 RX ADMIN — OXYCODONE HYDROCHLORIDE AND ACETAMINOPHEN 1 TABLET: 5; 325 TABLET ORAL at 06:18

## 2024-06-28 ASSESSMENT — PAIN DESCRIPTION - ORIENTATION
ORIENTATION: LEFT
ORIENTATION: LEFT

## 2024-06-28 ASSESSMENT — PAIN DESCRIPTION - LOCATION
LOCATION: LEG;HIP
LOCATION: HIP

## 2024-06-28 ASSESSMENT — PAIN SCALES - GENERAL
PAINLEVEL_OUTOF10: 9
PAINLEVEL_OUTOF10: 10

## 2024-06-28 ASSESSMENT — PAIN - FUNCTIONAL ASSESSMENT: PAIN_FUNCTIONAL_ASSESSMENT: 0-10

## 2024-06-28 ASSESSMENT — PAIN DESCRIPTION - FREQUENCY: FREQUENCY: CONTINUOUS

## 2024-06-28 ASSESSMENT — PAIN DESCRIPTION - PAIN TYPE: TYPE: ACUTE PAIN

## 2024-06-28 ASSESSMENT — LIFESTYLE VARIABLES: HOW OFTEN DO YOU HAVE A DRINK CONTAINING ALCOHOL: NEVER

## 2024-06-28 NOTE — PROGRESS NOTES
CLINICAL PHARMACY NOTE: MEDS TO BEDS    Total # of Prescriptions Filled: 3   The following medications were delivered to the patient:  Acetaminophen 500 mg  Methocarbamol 750 mg  Lidocaine 5% patches    Additional Documentation:

## 2024-06-28 NOTE — DISCHARGE INSTRUCTIONS
CT LUMBAR SPINE WO CONTRAST   Final Result   1.  No fracture or acute osseous abnormality.  Normal lumbar vertebral   alignment.      2.  Degenerative change with multilevel acquired stenoses.  Severe foraminal   narrowing at L3-4 on the left and L4-5 on the right.      3.  L5-S1 central and left paracentral posterior disc protrusion and with   secondary mild deformity of the S1 nerve root sleeves.

## 2024-06-28 NOTE — ED PROVIDER NOTES
attending    ------------------------------ ED COURSE/MEDICAL DECISION MAKING----------------------    Medical Decision Making/Differential Diagnosis:    CC/HPI Summary, Pertinent Physical Exam Findings, Social Determinants of health, Records Reviewed, DDx, testing done/not done, ED Course, Reassessment, disposition considerations/shared decision making with patient, consults, disposition:      Medical Decision Making/ED COURSE:    Chronic Conditions affecting care:    has a past medical history of Asthma, Diabetes mellitus (HCC), and Hyperlipidemia.     Myself and ED attending interpreted findings during patient's stay.   Vital signs BP (!) 146/81   Pulse 72   Temp 97.7 °F (36.5 °C) (Temporal)   Resp 18   Ht 1.6 m (5' 3\")   Wt 83.9 kg (185 lb)   SpO2 96%   BMI 32.77 kg/m²     Differential Diagnosis includes but is not limited to sciatica, fractures, musculoskeletal pain, cauda equina    Patient administered .  Medications   lidocaine 4 % external patch 1 patch (1 patch TransDERmal Patch Applied 6/28/24 0823)   oxyCODONE-acetaminophen (PERCOCET) 5-325 MG per tablet 1 tablet (1 tablet Oral Given 6/28/24 0618)     79-year-old female presents to the emergency room with complaints of left-sided lower back pain radiating down to the left ankle.  She mentioned that it has been going on since the past 4 days.  No history of trauma.  On and off numbness sensation.  She has been able to ambulate.  No changes in bowel and bladder symptoms.  No history of fevers.  No findings concerning for cauda equina.  Bilateral motor and sensory exam symmetrical.  On arrival she is afebrile and hemodynamically stable.  CT of the lumbar spine was obtained, lidocaine patch was applied and she was given Percocet for pain management.  CT lumbar spine with findings of degenerative changes with multilevel foraminal stenosis.  No acute findings related to fractures or trauma.  On repeat examination, patient did have improvement of pain.

## 2024-06-30 ENCOUNTER — HOSPITAL ENCOUNTER (EMERGENCY)
Age: 79
Discharge: HOME | End: 2024-06-30
Payer: MEDICARE

## 2024-06-30 VITALS
OXYGEN SATURATION: 97 % | HEART RATE: 78 BPM | TEMPERATURE: 97.3 F | DIASTOLIC BLOOD PRESSURE: 63 MMHG | RESPIRATION RATE: 18 BRPM | SYSTOLIC BLOOD PRESSURE: 144 MMHG

## 2024-06-30 DIAGNOSIS — M48.061 SPINAL STENOSIS OF LUMBAR REGION, UNSPECIFIED WHETHER NEUROGENIC CLAUDICATION PRESENT: ICD-10-CM

## 2024-06-30 DIAGNOSIS — M54.32 LEFT SIDED SCIATICA: Primary | ICD-10-CM

## 2024-06-30 PROCEDURE — 99283 EMERGENCY DEPT VISIT LOW MDM: CPT

## 2024-06-30 PROCEDURE — 6370000000 HC RX 637 (ALT 250 FOR IP): Performed by: PHYSICIAN ASSISTANT

## 2024-06-30 RX ORDER — PREDNISONE 20 MG/1
40 TABLET ORAL DAILY
Qty: 10 TABLET | Refills: 0 | Status: SHIPPED | OUTPATIENT
Start: 2024-06-30 | End: 2024-07-05

## 2024-06-30 RX ORDER — PREDNISONE 20 MG/1
40 TABLET ORAL ONCE
Status: COMPLETED | OUTPATIENT
Start: 2024-06-30 | End: 2024-06-30

## 2024-06-30 RX ADMIN — PREDNISONE 40 MG: 20 TABLET ORAL at 10:41

## 2024-06-30 ASSESSMENT — LIFESTYLE VARIABLES
HOW OFTEN DO YOU HAVE A DRINK CONTAINING ALCOHOL: NEVER
HOW MANY STANDARD DRINKS CONTAINING ALCOHOL DO YOU HAVE ON A TYPICAL DAY: PATIENT DOES NOT DRINK

## 2024-06-30 NOTE — ED PROVIDER NOTES
addressed.  They understand return precautions and discharge instructions. The patient  expressed understanding. Vitals were stable and they were in no distress at discharge.        Assessment      1. Left sided sciatica    2. Spinal stenosis of lumbar region, unspecified whether neurogenic claudication present      Plan   Discharged home.  Patient condition is good    New Medications     Discharge Medication List as of 6/30/2024 10:42 AM        START taking these medications    Details   predniSONE (DELTASONE) 20 MG tablet Take 2 tablets by mouth daily for 5 days, Disp-10 tablet, R-0Normal           Electronically signed by NETTIE Ortiz   DD: 6/30/24  **This report was transcribed using voice recognition software. Every effort was made to ensure accuracy; however, inadvertent computerized transcription errors may be present.  END OF ED PROVIDER NOTE      Yumiko Murry PA  06/30/24 8115

## 2024-07-12 LAB
ALBUMIN SERPL-MCNC: 3.7 G/DL (ref 3.5–5.2)
ALP SERPL-CCNC: 60 U/L (ref 35–104)
ALT SERPL-CCNC: 12 U/L (ref 0–32)
ANION GAP SERPL CALCULATED.3IONS-SCNC: 13 MMOL/L (ref 7–16)
AST SERPL-CCNC: 20 U/L (ref 0–31)
BASOPHILS # BLD: 0.06 K/UL (ref 0–0.2)
BASOPHILS NFR BLD: 1 % (ref 0–2)
BILIRUB SERPL-MCNC: 0.5 MG/DL (ref 0–1.2)
BUN SERPL-MCNC: 25 MG/DL (ref 6–23)
CALCIUM SERPL-MCNC: 9.2 MG/DL (ref 8.6–10.2)
CHLORIDE SERPL-SCNC: 103 MMOL/L (ref 98–107)
CHOLEST SERPL-MCNC: 141 MG/DL
CO2 SERPL-SCNC: 23 MMOL/L (ref 22–29)
CREAT SERPL-MCNC: 0.9 MG/DL (ref 0.5–1)
CREAT UR-MCNC: 122.9 MG/DL (ref 29–226)
EOSINOPHIL # BLD: 0.48 K/UL (ref 0.05–0.5)
EOSINOPHILS RELATIVE PERCENT: 6 % (ref 0–6)
ERYTHROCYTE [DISTWIDTH] IN BLOOD BY AUTOMATED COUNT: 13.8 % (ref 11.5–15)
GFR, ESTIMATED: 67 ML/MIN/1.73M2
GLUCOSE SERPL-MCNC: 98 MG/DL (ref 74–99)
HCT VFR BLD AUTO: 41.1 % (ref 34–48)
HDLC SERPL-MCNC: 35 MG/DL
HGB BLD-MCNC: 13.3 G/DL (ref 11.5–15.5)
IMM GRANULOCYTES # BLD AUTO: 0.04 K/UL (ref 0–0.58)
IMM GRANULOCYTES NFR BLD: 1 % (ref 0–5)
LDLC SERPL CALC-MCNC: 71 MG/DL
LYMPHOCYTES NFR BLD: 1.74 K/UL (ref 1.5–4)
LYMPHOCYTES RELATIVE PERCENT: 21 % (ref 20–42)
MCH RBC QN AUTO: 30 PG (ref 26–35)
MCHC RBC AUTO-ENTMCNC: 32.4 G/DL (ref 32–34.5)
MCV RBC AUTO: 92.6 FL (ref 80–99.9)
MICROALBUMIN UR-MCNC: <12 MG/L (ref 0–19)
MICROALBUMIN/CREAT UR-RTO: NORMAL MCG/MG CREAT (ref 0–30)
MONOCYTES NFR BLD: 1.06 K/UL (ref 0.1–0.95)
MONOCYTES NFR BLD: 13 % (ref 2–12)
NEUTROPHILS NFR BLD: 60 % (ref 43–80)
NEUTS SEG NFR BLD: 5.07 K/UL (ref 1.8–7.3)
PLATELET # BLD AUTO: 200 K/UL (ref 130–450)
PMV BLD AUTO: 10.9 FL (ref 7–12)
POTASSIUM SERPL-SCNC: 4.8 MMOL/L (ref 3.5–5)
PROT SERPL-MCNC: 6.6 G/DL (ref 6.4–8.3)
RBC # BLD AUTO: 4.44 M/UL (ref 3.5–5.5)
SODIUM SERPL-SCNC: 139 MMOL/L (ref 132–146)
T4 SERPL-MCNC: 8.2 UG/DL (ref 4.5–11.7)
TRIGL SERPL-MCNC: 174 MG/DL
TSH SERPL DL<=0.05 MIU/L-ACNC: 3.77 UIU/ML (ref 0.27–4.2)
VLDLC SERPL CALC-MCNC: 35 MG/DL
WBC OTHER # BLD: 8.5 K/UL (ref 4.5–11.5)

## 2024-07-15 ENCOUNTER — OFFICE VISIT (OUTPATIENT)
Dept: NEUROSURGERY | Age: 79
End: 2024-07-15
Payer: MEDICARE

## 2024-07-15 VITALS
DIASTOLIC BLOOD PRESSURE: 69 MMHG | HEART RATE: 64 BPM | SYSTOLIC BLOOD PRESSURE: 121 MMHG | BODY MASS INDEX: 32.78 KG/M2 | WEIGHT: 185 LBS | HEIGHT: 63 IN | RESPIRATION RATE: 16 BRPM | OXYGEN SATURATION: 92 %

## 2024-07-15 DIAGNOSIS — M54.16 LUMBAR RADICULOPATHY: Primary | ICD-10-CM

## 2024-07-15 PROCEDURE — 99203 OFFICE O/P NEW LOW 30 MIN: CPT | Performed by: PHYSICIAN ASSISTANT

## 2024-07-15 PROCEDURE — 1123F ACP DISCUSS/DSCN MKR DOCD: CPT | Performed by: PHYSICIAN ASSISTANT

## 2024-07-15 RX ORDER — PANTOPRAZOLE SODIUM 40 MG/1
TABLET, DELAYED RELEASE ORAL
COMMUNITY

## 2024-07-15 RX ORDER — LIDOCAINE HYDROCHLORIDE 10 MG/ML
INJECTION, SOLUTION INFILTRATION; PERINEURAL
COMMUNITY
Start: 2022-05-03

## 2024-07-15 RX ORDER — TRAMADOL HYDROCHLORIDE 50 MG/1
TABLET ORAL
COMMUNITY
Start: 2024-07-11

## 2024-07-15 RX ORDER — TIZANIDINE 4 MG/1
TABLET ORAL
COMMUNITY
Start: 2024-07-03

## 2024-07-15 ASSESSMENT — ENCOUNTER SYMPTOMS
ALLERGIC/IMMUNOLOGIC NEGATIVE: 1
BACK PAIN: 1
EYES NEGATIVE: 1
GASTROINTESTINAL NEGATIVE: 1
RESPIRATORY NEGATIVE: 1

## 2024-07-30 ENCOUNTER — OFFICE VISIT (OUTPATIENT)
Dept: PAIN MANAGEMENT | Age: 79
End: 2024-07-30
Payer: MEDICARE

## 2024-07-30 VITALS
BODY MASS INDEX: 32.78 KG/M2 | DIASTOLIC BLOOD PRESSURE: 60 MMHG | HEART RATE: 78 BPM | HEIGHT: 63 IN | TEMPERATURE: 96.8 F | OXYGEN SATURATION: 96 % | SYSTOLIC BLOOD PRESSURE: 118 MMHG | RESPIRATION RATE: 18 BRPM | WEIGHT: 185 LBS

## 2024-07-30 DIAGNOSIS — M48.061 SPINAL STENOSIS OF LUMBAR REGION WITHOUT NEUROGENIC CLAUDICATION: ICD-10-CM

## 2024-07-30 DIAGNOSIS — M47.816 LUMBAR SPONDYLOSIS: ICD-10-CM

## 2024-07-30 DIAGNOSIS — M51.9 LUMBAR DISC DISORDER: ICD-10-CM

## 2024-07-30 DIAGNOSIS — M47.816 LUMBAR FACET ARTHROPATHY: ICD-10-CM

## 2024-07-30 DIAGNOSIS — M54.16 LUMBAR RADICULOPATHY: ICD-10-CM

## 2024-07-30 DIAGNOSIS — G89.4 CHRONIC PAIN SYNDROME: Primary | ICD-10-CM

## 2024-07-30 LAB
25(OH)D3 SERPL-MCNC: 34.6 NG/ML (ref 30–100)
ANION GAP SERPL CALCULATED.3IONS-SCNC: 19 MMOL/L (ref 7–16)
BILIRUB UR QL STRIP: NEGATIVE
BUN SERPL-MCNC: 27 MG/DL (ref 6–23)
CALCIUM SERPL-MCNC: 9.6 MG/DL (ref 8.6–10.2)
CHLORIDE SERPL-SCNC: 104 MMOL/L (ref 98–107)
CLARITY UR: CLEAR
CO2 SERPL-SCNC: 20 MMOL/L (ref 22–29)
COLOR UR: YELLOW
COMMENT: ABNORMAL
CREAT SERPL-MCNC: 0.9 MG/DL (ref 0.5–1)
CREAT UR-MCNC: 234 MG/DL (ref 29–226)
ERYTHROCYTE [DISTWIDTH] IN BLOOD BY AUTOMATED COUNT: 13.4 % (ref 11.5–15)
GFR, ESTIMATED: 66 ML/MIN/1.73M2
GLUCOSE SERPL-MCNC: 108 MG/DL (ref 74–99)
GLUCOSE UR STRIP-MCNC: NEGATIVE MG/DL
HCT VFR BLD AUTO: 38.8 % (ref 34–48)
HGB BLD-MCNC: 12.7 G/DL (ref 11.5–15.5)
HGB UR QL STRIP.AUTO: NEGATIVE
KETONES UR STRIP-MCNC: ABNORMAL MG/DL
LEUKOCYTE ESTERASE UR QL STRIP: NEGATIVE
MAGNESIUM SERPL-MCNC: 1.6 MG/DL (ref 1.6–2.6)
MCH RBC QN AUTO: 29.3 PG (ref 26–35)
MCHC RBC AUTO-ENTMCNC: 32.7 G/DL (ref 32–34.5)
MCV RBC AUTO: 89.6 FL (ref 80–99.9)
MICROALBUMIN UR-MCNC: 13 MG/L (ref 0–19)
MICROALBUMIN/CREAT UR-RTO: 6 MCG/MG CREAT (ref 0–30)
NITRITE UR QL STRIP: NEGATIVE
PH UR STRIP: 5.5 [PH] (ref 5–9)
PHOSPHATE SERPL-MCNC: 3.1 MG/DL (ref 2.5–4.5)
PLATELET # BLD AUTO: 273 K/UL (ref 130–450)
PMV BLD AUTO: 11.2 FL (ref 7–12)
POTASSIUM SERPL-SCNC: 4.5 MMOL/L (ref 3.5–5)
PROT UR STRIP-MCNC: NEGATIVE MG/DL
PTH-INTACT SERPL-MCNC: 30.4 PG/ML (ref 15–65)
RBC # BLD AUTO: 4.33 M/UL (ref 3.5–5.5)
SODIUM SERPL-SCNC: 143 MMOL/L (ref 132–146)
SP GR UR STRIP: >1.03 (ref 1–1.03)
URATE SERPL-MCNC: 5.8 MG/DL (ref 2.4–5.7)
UROBILINOGEN UR STRIP-ACNC: 0.2 EU/DL (ref 0–1)
WBC OTHER # BLD: 7.3 K/UL (ref 4.5–11.5)

## 2024-07-30 PROCEDURE — 1123F ACP DISCUSS/DSCN MKR DOCD: CPT | Performed by: PAIN MEDICINE

## 2024-07-30 PROCEDURE — 99204 OFFICE O/P NEW MOD 45 MIN: CPT | Performed by: PAIN MEDICINE

## 2024-07-30 RX ORDER — ATORVASTATIN CALCIUM 40 MG/1
TABLET, FILM COATED ORAL
COMMUNITY
Start: 2024-05-15

## 2024-07-30 RX ORDER — SODIUM CHLORIDE 0.9 % (FLUSH) 0.9 %
5-40 SYRINGE (ML) INJECTION PRN
OUTPATIENT
Start: 2024-07-30

## 2024-07-30 RX ORDER — METHOCARBAMOL 750 MG/1
TABLET, FILM COATED ORAL
COMMUNITY
Start: 2024-07-17

## 2024-07-30 RX ORDER — SODIUM CHLORIDE 0.9 % (FLUSH) 0.9 %
5-40 SYRINGE (ML) INJECTION EVERY 12 HOURS SCHEDULED
OUTPATIENT
Start: 2024-07-30

## 2024-07-30 RX ORDER — SODIUM CHLORIDE 9 MG/ML
INJECTION, SOLUTION INTRAVENOUS PRN
OUTPATIENT
Start: 2024-07-30

## 2024-07-30 NOTE — PROGRESS NOTES
Juliana Mckeon presents to the [facility] on 7/30/2024. Juliana is complaining of pain in her lower back that radiates to LLE. The pain is constant. The pain is described as aching and numb. Pain is rated on her best day at a 5, on her worst day at a 10, and on average at a 7 on the VAS scale. She took her last dose of Mobic and Tylenol and tramadol today.  Also using heat therapy. Pt is currently in physical therapy.    Any procedures since your last visit: No    She is  on NSAIDS and  is  on anticoagulation medications to include Plavix and is managed by Titus Bennett DO       Pacemaker or defibrillator: No     Medication Contract and Consent for Opioid Use Documents Filed        No documents found                       Resp 18   Ht 1.6 m (5' 2.99\")   Wt 83.9 kg (185 lb)   BMI 32.78 kg/m²      No LMP recorded. Patient is postmenopausal.

## 2024-07-30 NOTE — PROGRESS NOTES
0           [facility]         [address], [place] [address]      [phone]          Consult Note      Patient:  DUNCAN Daley 1945    Date of Service:  24    Requesting Physician:  German Tijerina PA    Reason for Consult:      Patient presents with complaints of low back pain that started 2024 and has been progressively getting worse.  Pain is described as aching/shooting/intermittent.  Pain is aggravated by bending/sitting. Pain is relieved by heat.    HISTORY OF PRESENT ILLNESS:      Pain does radiate to Left lower extremity down to the ankle. She  has numbness of the left leg/bottom of her feet and does not have bladder or bowel dysfunction.    She has been on anticoagulation medications to include Plavix. The patient  has not been on herbal supplements.  The patient is diabetic.    Imaging:   Lumbar spine CT   1.  No fracture or acute osseous abnormality.  Normal lumbar vertebral  alignment.  2.  Degenerative change with multilevel acquired stenoses.  Severe foraminal  narrowing at L3-4 on the left and L4-5 on the right.  3.  L5-S1 central and left paracentral posterior disc protrusion and with  secondary mild deformity of the S1 nerve root sleeves.    Previous treatments: Surgery bilateral knees replaced and medications..      Opioid Agreement:  Renewal date:N/A    Past Medical History:   Diagnosis Date    Asthma     Diabetes mellitus (HCC)     Hyperlipidemia        Past Surgical History:   Procedure Laterality Date    CARDIAC SURGERY      stent    CHOLECYSTECTOMY      KNEE SURGERY         Prior to Admission medications    Medication Sig Start Date End Date Taking? Authorizing Provider   atorvastatin (LIPITOR) 40 MG tablet  5/15/24  Yes ProviderJennifer MD   traMADol (ULTRAM) 50 MG tablet  24  Yes Provider, MD Jennifer   lidocaine 1 % injection Take by injection route. 5/3/22  Yes Provider, MD Jennifer   pantoprazole

## 2024-08-16 ENCOUNTER — HOSPITAL ENCOUNTER (OUTPATIENT)
Dept: OTHER | Age: 79
Setting detail: THERAPIES SERIES
Discharge: HOME | End: 2024-08-16
Payer: MEDICARE

## 2024-08-16 VITALS
HEART RATE: 71 BPM | OXYGEN SATURATION: 97 % | DIASTOLIC BLOOD PRESSURE: 64 MMHG | RESPIRATION RATE: 17 BRPM | BODY MASS INDEX: 33.49 KG/M2 | SYSTOLIC BLOOD PRESSURE: 116 MMHG | WEIGHT: 189 LBS

## 2024-08-16 LAB
ANION GAP SERPL CALCULATED.3IONS-SCNC: 12 MMOL/L (ref 7–16)
BNP SERPL-MCNC: 154 PG/ML (ref 0–450)
BUN SERPL-MCNC: 27 MG/DL (ref 6–23)
CALCIUM SERPL-MCNC: 9.2 MG/DL (ref 8.6–10.2)
CHLORIDE SERPL-SCNC: 105 MMOL/L (ref 98–107)
CO2 SERPL-SCNC: 23 MMOL/L (ref 22–29)
CREAT SERPL-MCNC: 0.8 MG/DL (ref 0.5–1)
GFR, ESTIMATED: 75 ML/MIN/1.73M2
GLUCOSE SERPL-MCNC: 168 MG/DL (ref 74–99)
POTASSIUM SERPL-SCNC: 3.7 MMOL/L (ref 3.5–5)
SODIUM SERPL-SCNC: 140 MMOL/L (ref 132–146)

## 2024-08-16 PROCEDURE — 36415 COLL VENOUS BLD VENIPUNCTURE: CPT

## 2024-08-16 PROCEDURE — 99214 OFFICE O/P EST MOD 30 MIN: CPT

## 2024-08-16 PROCEDURE — 83880 ASSAY OF NATRIURETIC PEPTIDE: CPT

## 2024-08-16 PROCEDURE — 80048 BASIC METABOLIC PNL TOTAL CA: CPT

## 2024-08-16 NOTE — PROGRESS NOTES
Congestive Heart Failure Clinic   [facility]       Referring Provider: DR. Antonio  Primary Care Physician: Titus Bennett DO   Cardiologist: Dr. Antonio   Nephrologist:       HISTORY OF PRESENT ILLNESS:     Juliana Mckeon is a 79 y.o. (1945) female with a history of HFpEF, most recent EF:  Lab Results   Component Value Date    LVEF 68 04/05/2023     She presents to the CHF clinic for ongoing evaluation and monitoring of heart failure.  In the CHF clinic today she denies any adverse symptoms except:  [] Dizziness or lightheadedness   [] Syncope or near syncope  [] Recent Fall  [] Shortness of breath at rest   [] Dyspnea with exertion  [] Decline in functional capacity (unable to perform activities they had previously been able to do)  [] Fatigue   [] Orthopnea  [] PND  [] Nocturnal cough  [] Hemoptysis  [] Chest pain, pressure, or discomfort  [] Palpitations  [] Abdominal distention  [] Abdominal bloating  [] Early satiety  [] Blood in stool   [] Diarrhea  [] Constipation  [] Nausea/Vomiting  [] Decreased urinary response to oral diuretic   [] Scrotal swelling   [] Lower extremity edema  [] Used PRN doses of oral diuretic   [] Weight gain    Wt Readings from Last 3 Encounters:   08/16/24 85.7 kg (189 lb)   07/30/24 83.9 kg (185 lb)   07/15/24 83.9 kg (185 lb)       SOCIAL HISTORY:  [] Denies tobacco, alcohol or illicit drug abuse  [] Tobacco use:  [] ETOH use:  [] Illicit drug use:        MEDICATIONS:    Allergies   Allergen Reactions    Asa [Aspirin] Shortness Of Breath    Pcn [Penicillins] Swelling    Iodine Rash       Current Outpatient Medications:     atorvastatin (LIPITOR) 40 MG tablet, 2 tablets, Disp: , Rfl:     traMADol (ULTRAM) 50 MG tablet, , Disp: , Rfl:     pantoprazole (PROTONIX) 40 MG tablet, , Disp: , Rfl:     acetaminophen (TYLENOL) 500 MG tablet, Take 1 tablet by mouth 4 times daily as needed for Pain, Disp: 120 tablet, Rfl: 0

## 2024-11-11 ENCOUNTER — HOSPITAL ENCOUNTER (OUTPATIENT)
Dept: OTHER | Age: 79
Setting detail: THERAPIES SERIES
Discharge: HOME | End: 2024-11-11
Payer: MEDICARE

## 2024-11-11 VITALS
DIASTOLIC BLOOD PRESSURE: 65 MMHG | RESPIRATION RATE: 16 BRPM | BODY MASS INDEX: 32.14 KG/M2 | WEIGHT: 181.4 LBS | OXYGEN SATURATION: 99 % | HEART RATE: 70 BPM | SYSTOLIC BLOOD PRESSURE: 137 MMHG

## 2024-11-11 LAB
ANION GAP SERPL CALCULATED.3IONS-SCNC: 12 MMOL/L (ref 7–16)
BNP SERPL-MCNC: 197 PG/ML (ref 0–450)
BUN SERPL-MCNC: 27 MG/DL (ref 6–23)
CALCIUM SERPL-MCNC: 9.9 MG/DL (ref 8.6–10.2)
CHLORIDE SERPL-SCNC: 102 MMOL/L (ref 98–107)
CO2 SERPL-SCNC: 28 MMOL/L (ref 22–29)
CREAT SERPL-MCNC: 0.9 MG/DL (ref 0.5–1)
GFR, ESTIMATED: 64 ML/MIN/1.73M2
GLUCOSE SERPL-MCNC: 107 MG/DL (ref 74–99)
POTASSIUM SERPL-SCNC: 4.1 MMOL/L (ref 3.5–5)
SODIUM SERPL-SCNC: 142 MMOL/L (ref 132–146)

## 2024-11-11 PROCEDURE — 83880 ASSAY OF NATRIURETIC PEPTIDE: CPT

## 2024-11-11 PROCEDURE — 36415 COLL VENOUS BLD VENIPUNCTURE: CPT

## 2024-11-11 PROCEDURE — 99214 OFFICE O/P EST MOD 30 MIN: CPT

## 2024-11-11 PROCEDURE — 80048 BASIC METABOLIC PNL TOTAL CA: CPT

## 2024-11-11 ASSESSMENT — PATIENT HEALTH QUESTIONNAIRE - PHQ9
SUM OF ALL RESPONSES TO PHQ QUESTIONS 1-9: 0
SUM OF ALL RESPONSES TO PHQ QUESTIONS 1-9: 0
1. LITTLE INTEREST OR PLEASURE IN DOING THINGS: NOT AT ALL
SUM OF ALL RESPONSES TO PHQ QUESTIONS 1-9: 0
SUM OF ALL RESPONSES TO PHQ9 QUESTIONS 1 & 2: 0
2. FEELING DOWN, DEPRESSED OR HOPELESS: NOT AT ALL
SUM OF ALL RESPONSES TO PHQ QUESTIONS 1-9: 0

## 2024-11-11 NOTE — PROGRESS NOTES
Congestive Heart Failure Clinic   [facility]       Referring Provider: DR. Antonio  Primary Care Physician: Titus Bennett DO   Cardiologist: Dr. Antonio   Nephrologist:       HISTORY OF PRESENT ILLNESS:     Juliana Mckeon is a 79 y.o. (1945) female with a history of HFpEF, most recent EF:  Lab Results   Component Value Date    LVEF 68 04/05/2023    LVEFMODE Echo 04/05/2023     She presents to the CHF clinic for ongoing evaluation and monitoring of heart failure.  In the CHF clinic today she denies any adverse symptoms except:  [x] Dizziness or lightheadedness   [] Syncope or near syncope  [] Recent Fall  [] Shortness of breath at rest   [] Dyspnea with exertion  [] Decline in functional capacity (unable to perform activities they had previously been able to do)  [] Fatigue   [] Orthopnea  [] PND  [] Nocturnal cough  [] Hemoptysis  [] Chest pain, pressure, or discomfort  [] Palpitations  [] Abdominal distention  [] Abdominal bloating  [] Early satiety  [] Blood in stool   [] Diarrhea  [] Constipation  [] Nausea/Vomiting  [] Decreased urinary response to oral diuretic   [] Scrotal swelling   [] Lower extremity edema  [] Used PRN doses of oral diuretic   [] Weight gain    Wt Readings from Last 3 Encounters:   11/11/24 82.3 kg (181 lb 6.4 oz)   08/16/24 85.7 kg (189 lb)   07/30/24 83.9 kg (185 lb)       SOCIAL HISTORY:  [x] Denies tobacco, alcohol or illicit drug abuse  [] Tobacco use:  [] ETOH use:  [] Illicit drug use:        MEDICATIONS:    Allergies   Allergen Reactions    Asa [Aspirin] Shortness Of Breath    Pcn [Penicillins] Swelling    Iodine Rash       Current Outpatient Medications:     atorvastatin (LIPITOR) 40 MG tablet, Take 1 tablet by mouth daily, Disp: , Rfl:     acetaminophen (TYLENOL) 500 MG tablet, Take 1 tablet by mouth 4 times daily as needed for Pain, Disp: 120 tablet, Rfl: 0    fluticasone-salmeterol (ADVAIR HFA) 230-21 MCG/ACT inhaler, Inhale 2

## 2025-02-11 ENCOUNTER — HOSPITAL ENCOUNTER (OUTPATIENT)
Dept: OTHER | Age: 80
Setting detail: THERAPIES SERIES
Discharge: HOME | End: 2025-02-11
Payer: MEDICARE

## 2025-02-11 VITALS
OXYGEN SATURATION: 97 % | WEIGHT: 182 LBS | RESPIRATION RATE: 17 BRPM | SYSTOLIC BLOOD PRESSURE: 105 MMHG | BODY MASS INDEX: 32.25 KG/M2 | DIASTOLIC BLOOD PRESSURE: 53 MMHG | HEART RATE: 65 BPM

## 2025-02-11 LAB
ANION GAP SERPL CALCULATED.3IONS-SCNC: 11 MMOL/L (ref 7–16)
BNP SERPL-MCNC: 177 PG/ML (ref 0–450)
BUN SERPL-MCNC: 22 MG/DL (ref 6–23)
CALCIUM SERPL-MCNC: 9.8 MG/DL (ref 8.6–10.2)
CHLORIDE SERPL-SCNC: 105 MMOL/L (ref 98–107)
CO2 SERPL-SCNC: 24 MMOL/L (ref 22–29)
CREAT SERPL-MCNC: 0.7 MG/DL (ref 0.5–1)
GFR, ESTIMATED: 82 ML/MIN/1.73M2
GLUCOSE SERPL-MCNC: 126 MG/DL (ref 74–99)
POTASSIUM SERPL-SCNC: 4.3 MMOL/L (ref 3.5–5)
SODIUM SERPL-SCNC: 140 MMOL/L (ref 132–146)

## 2025-02-11 PROCEDURE — 36415 COLL VENOUS BLD VENIPUNCTURE: CPT

## 2025-02-11 PROCEDURE — 99214 OFFICE O/P EST MOD 30 MIN: CPT

## 2025-02-11 PROCEDURE — 80048 BASIC METABOLIC PNL TOTAL CA: CPT

## 2025-02-11 PROCEDURE — 83880 ASSAY OF NATRIURETIC PEPTIDE: CPT

## 2025-02-11 ASSESSMENT — PATIENT HEALTH QUESTIONNAIRE - PHQ9
2. FEELING DOWN, DEPRESSED OR HOPELESS: SEVERAL DAYS
SUM OF ALL RESPONSES TO PHQ QUESTIONS 1-9: 1
1. LITTLE INTEREST OR PLEASURE IN DOING THINGS: NOT AT ALL
SUM OF ALL RESPONSES TO PHQ QUESTIONS 1-9: 1
SUM OF ALL RESPONSES TO PHQ9 QUESTIONS 1 & 2: 1
SUM OF ALL RESPONSES TO PHQ QUESTIONS 1-9: 1
SUM OF ALL RESPONSES TO PHQ QUESTIONS 1-9: 1

## 2025-02-11 NOTE — PROGRESS NOTES
Congestive Heart Failure Clinic   [facility]       Referring Provider: DR. Antonio  Primary Care Physician: Titus Bennett DO   Cardiologist: Dr. Antonio   Nephrologist:       HISTORY OF PRESENT ILLNESS:     Juliana Mckeon is a 80 y.o. (1945) female with a history of HFpEF, most recent EF:  Lab Results   Component Value Date    LVEF 68 04/05/2023    LVEFMODE Echo 04/05/2023     She presents to the CHF clinic for ongoing evaluation and monitoring of heart failure.  In the CHF clinic today she denies any adverse symptoms except:  [x] Dizziness or lightheadedness   [] Syncope or near syncope  [] Recent Fall  [] Shortness of breath at rest   [] Dyspnea with exertion  [] Decline in functional capacity (unable to perform activities they had previously been able to do)  [] Fatigue   [] Orthopnea  [] PND  [] Nocturnal cough  [] Hemoptysis  [] Chest pain, pressure, or discomfort  [] Palpitations  [] Abdominal distention  [] Abdominal bloating  [] Early satiety  [] Blood in stool   [] Diarrhea  [] Constipation  [] Nausea/Vomiting  [] Decreased urinary response to oral diuretic   [] Scrotal swelling   [] Lower extremity edema  [] Used PRN doses of oral diuretic   [] Weight gain    Wt Readings from Last 3 Encounters:   02/11/25 82.6 kg (182 lb)   11/11/24 82.3 kg (181 lb 6.4 oz)   08/16/24 85.7 kg (189 lb)       SOCIAL HISTORY:  [x] Denies tobacco, alcohol or illicit drug abuse  [] Tobacco use:  [] ETOH use:  [] Illicit drug use:        MEDICATIONS:    Allergies   Allergen Reactions    Asa [Aspirin] Shortness Of Breath    Pcn [Penicillins] Swelling    Iodine Rash       Current Outpatient Medications:     atorvastatin (LIPITOR) 40 MG tablet, Take 1 tablet by mouth daily, Disp: , Rfl:     acetaminophen (TYLENOL) 500 MG tablet, Take 1 tablet by mouth 4 times daily as needed for Pain, Disp: 120 tablet, Rfl: 0    fluticasone-salmeterol (ADVAIR HFA) 230-21 MCG/ACT inhaler, Inhale 2

## 2025-02-13 ENCOUNTER — HOSPITAL ENCOUNTER (OUTPATIENT)
Dept: MAMMOGRAPHY | Age: 80
Discharge: HOME | End: 2025-02-15
Attending: FAMILY MEDICINE
Payer: MEDICARE

## 2025-02-13 DIAGNOSIS — M81.0 AGE-RELATED OSTEOPOROSIS WITHOUT CURRENT PATHOLOGICAL FRACTURE: ICD-10-CM

## 2025-02-13 PROCEDURE — 77080 DXA BONE DENSITY AXIAL: CPT

## 2025-06-06 ENCOUNTER — HOSPITAL ENCOUNTER (EMERGENCY)
Age: 80
Discharge: HOME | End: 2025-06-06
Attending: EMERGENCY MEDICINE
Payer: MEDICARE

## 2025-06-06 VITALS
RESPIRATION RATE: 18 BRPM | HEART RATE: 72 BPM | OXYGEN SATURATION: 98 % | SYSTOLIC BLOOD PRESSURE: 130 MMHG | TEMPERATURE: 97.6 F | WEIGHT: 174 LBS | BODY MASS INDEX: 30.83 KG/M2 | DIASTOLIC BLOOD PRESSURE: 69 MMHG
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
## 2025-06-06 DIAGNOSIS — T18.128A ESOPHAGEAL OBSTRUCTION DUE TO FOOD IMPACTION: Primary | ICD-10-CM

## 2025-06-06 LAB
EKG ATRIAL RATE: 68 BPM
EKG P AXIS: 54 DEGREES
EKG P-R INTERVAL: 192 MS
EKG Q-T INTERVAL: 406 MS
EKG QRS DURATION: 94 MS
EKG QTC CALCULATION (BAZETT): 431 MS
EKG R AXIS: 28 DEGREES
EKG T AXIS: 39 DEGREES
EKG VENTRICULAR RATE: 68 BPM

## 2025-06-06 PROCEDURE — 99284 EMERGENCY DEPT VISIT MOD MDM: CPT

## 2025-06-06 PROCEDURE — 96374 THER/PROPH/DIAG INJ IV PUSH: CPT

## 2025-06-06 PROCEDURE — 93005 ELECTROCARDIOGRAM TRACING: CPT | Performed by: EMERGENCY MEDICINE

## 2025-06-06 PROCEDURE — 6360000002 HC RX W HCPCS: Performed by: EMERGENCY MEDICINE

## 2025-06-06 PROCEDURE — 2500000003 HC RX 250 WO HCPCS

## 2025-06-06 RX ORDER — GLUCAGON 1 MG/ML
1 KIT INJECTION ONCE
Status: DISCONTINUED | OUTPATIENT
Start: 2025-06-06 | End: 2025-06-06

## 2025-06-06 RX ORDER — WATER 10 ML/10ML
INJECTION INTRAMUSCULAR; INTRAVENOUS; SUBCUTANEOUS
Status: COMPLETED
Start: 2025-06-06 | End: 2025-06-06

## 2025-06-06 RX ORDER — GLUCAGON 1 MG/ML
1 KIT INJECTION ONCE
Status: COMPLETED | OUTPATIENT
Start: 2025-06-06 | End: 2025-06-06

## 2025-06-06 RX ADMIN — WATER 10 ML: 1 INJECTION INTRAMUSCULAR; INTRAVENOUS; SUBCUTANEOUS at 19:51

## 2025-06-06 RX ADMIN — GLUCAGON 1 MG: 1 INJECTION, POWDER, LYOPHILIZED, FOR SOLUTION INTRAMUSCULAR; INTRAVENOUS at 19:50

## 2025-06-06 ASSESSMENT — PAIN DESCRIPTION - DESCRIPTORS: DESCRIPTORS: PRESSURE

## 2025-06-06 ASSESSMENT — PAIN DESCRIPTION - LOCATION: LOCATION: ABDOMEN

## 2025-06-06 ASSESSMENT — PAIN - FUNCTIONAL ASSESSMENT: PAIN_FUNCTIONAL_ASSESSMENT: 0-10

## 2025-06-06 ASSESSMENT — PAIN DESCRIPTION - ORIENTATION: ORIENTATION: UPPER

## 2025-06-06 ASSESSMENT — LIFESTYLE VARIABLES: HOW MANY STANDARD DRINKS CONTAINING ALCOHOL DO YOU HAVE ON A TYPICAL DAY: PATIENT DOES NOT DRINK

## 2025-06-06 NOTE — ED PROVIDER NOTES
myself.  Pulse 72   Temp 97.6 °F (36.4 °C) (Temporal)   Resp 18   Wt 78.9 kg (174 lb)   SpO2 98%   BMI 30.83 kg/m²   Oxygen Saturation Interpretation: Normal    The patient’s available past medical records and past encounters were reviewed.        ------------------------------ ED COURSE/MEDICAL DECISION MAKING----------------------  Medications   glucagon injection 1 mg (1 mg IntraVENous Given 25)   sterile water injection (10 mLs  Given 25)       The patient presents with a new acute problem or complaint.      Differential Diagnoses:  Food bolus impaction, dysphagia, ACS, among other pathologies      Counseling:   The emergency provider has spoken with the patient and discussed today’s results, in addition to providing specific details for the plan of care and counseling regarding the diagnosis and prognosis.  Questions are answered at this time and they are agreeable with the plan.    ED Course/Medical Decision Makin y.o. female here with dysphagia secondary to esophageal food bolus impaction.  On arrival patient is well-appearing in no acute distress, hemodynamically stable, breathing comfortably on room air without respiratory distress.  Treated w/ IV glucagon and given ice cold pepsi to drink.  On reevaluation she is tolerating p.o. intake and feels resolution of her symptoms and thinks the obstruction has resolved.  She will follow-up with her GI as an outpatient.  She will return for any new concerning or worsening symptoms.  After discussion of findings and return precautions, patient agrees with plan for discharge and outpatient follow up with primary care and GI.       --------------------------------- IMPRESSION AND DISPOSITION ---------------------------------    IMPRESSION  1. Esophageal obstruction due to food impaction        DISPOSITION  Disposition: Discharge to home  Patient condition is stable      NOTE: This report was transcribed using voice recognition software.  Every effort was made to ensure accuracy; however, inadvertent computerized transcription errors may be present    Veronica Newman MD  Attending Emergency Physician        Veronica Newman MD  06/06/25 2100

## 2025-06-07 NOTE — DISCHARGE INSTR - COC
Continuity of Care Form    Patient Name: Juliana Mckeon   :  1945  MRN:  [id number]    Admit date:  2025  Discharge date:  ***    Code Status Order: Prior   Advance Directives:     Admitting Physician:  No admitting provider for patient encounter.  PCP: Titus Bennett DO    Discharging Nurse: ***  Discharging Hospital Unit/Room#:   Discharging Unit Phone Number: ***    Emergency Contact:   Extended Emergency Contact Information  Primary Emergency Contact: Ariel Mckeon  Address: [address] [place] [address]  Home Phone: [phone]  Mobile Phone: [phone]  Relation: Spouse   needed? No    Past Surgical History:  Past Surgical History:   Procedure Laterality Date    CARDIAC SURGERY      stent    CHOLECYSTECTOMY      KNEE SURGERY         Immunization History:   Immunization History   Administered Date(s) Administered    TDaP, ADACEL (age 10y-64y), BOOSTRIX (age 10y+), IM, 0.5mL 2013       Active Problems:  Patient Active Problem List   Diagnosis Code    Back pain M54.9    Skin ulcer of left lower leg, limited to breakdown of skin (HCC) L97.921    Chest pain due to CAD I25.119    Chronic pain syndrome G89.4    Lumbar spondylosis M47.816    Lumbar radiculopathy M54.16    Lumbar disc disorder M51.9    Spinal stenosis of lumbar region without neurogenic claudication M48.061       Isolation/Infection:   Isolation            No Isolation          Patient Infection Status    None to display         Nurse Assessment:  Last Vital Signs: /69   Pulse 72   Temp 97.6 °F (36.4 °C) (Temporal)   Resp 18   Wt 78.9 kg (174 lb)   SpO2 98%   BMI 30.83 kg/m²     Last documented pain score (0-10 scale):    Last Weight:   Wt Readings from Last 1 Encounters:   25 78.9 kg (174 lb)     Mental Status:  {IP PT MENTAL STATUS:}    IV Access:  { SUNSHINE IV ACCESS:[id number]}    Nursing Mobility/ADLs:  Walking   {P DME

## 2025-06-09 PROCEDURE — 93010 ELECTROCARDIOGRAM REPORT: CPT | Performed by: INTERNAL MEDICINE

## 2025-06-27 ENCOUNTER — TELEPHONE (OUTPATIENT)
Dept: OTHER | Age: 80
End: 2025-06-27

## 2025-06-27 NOTE — TELEPHONE ENCOUNTER
Pt was a no call no show at today's scheduled CHF clinic visit.  I have attempted to contact this patient by phone with the following results:       -No answer and unable to left a VM at this time.

## 2025-07-29 LAB
ALBUMIN SERPL-MCNC: 3.9 G/DL (ref 3.5–5.2)
ALP SERPL-CCNC: 58 U/L (ref 35–104)
ALT SERPL-CCNC: 19 U/L (ref 0–35)
ANION GAP SERPL CALCULATED.3IONS-SCNC: 13 MMOL/L (ref 7–16)
AST SERPL-CCNC: 28 U/L (ref 0–35)
BASOPHILS # BLD: 0.05 K/UL (ref 0–0.2)
BASOPHILS NFR BLD: 1 % (ref 0–2)
BILIRUB SERPL-MCNC: 0.6 MG/DL (ref 0–1.2)
BUN SERPL-MCNC: 22 MG/DL (ref 8–23)
CALCIUM SERPL-MCNC: 9.2 MG/DL (ref 8.8–10.2)
CHLORIDE SERPL-SCNC: 108 MMOL/L (ref 98–107)
CHOLEST SERPL-MCNC: 118 MG/DL
CO2 SERPL-SCNC: 21 MMOL/L (ref 22–29)
CREAT SERPL-MCNC: 0.7 MG/DL (ref 0.5–1)
EOSINOPHIL # BLD: 0.26 K/UL (ref 0.05–0.5)
EOSINOPHILS RELATIVE PERCENT: 4 % (ref 0–6)
ERYTHROCYTE [DISTWIDTH] IN BLOOD BY AUTOMATED COUNT: 13.8 % (ref 11.5–15)
GFR, ESTIMATED: 85 ML/MIN/1.73M2
GLUCOSE SERPL-MCNC: 101 MG/DL (ref 74–99)
HCT VFR BLD AUTO: 38.1 % (ref 34–48)
HDLC SERPL-MCNC: 40 MG/DL
HGB BLD-MCNC: 12.7 G/DL (ref 11.5–15.5)
IMM GRANULOCYTES # BLD AUTO: <0.03 K/UL (ref 0–0.58)
IMM GRANULOCYTES NFR BLD: 0 % (ref 0–5)
LDLC SERPL CALC-MCNC: 59 MG/DL
LYMPHOCYTES NFR BLD: 1.83 K/UL (ref 1.5–4)
LYMPHOCYTES RELATIVE PERCENT: 25 % (ref 20–42)
MCH RBC QN AUTO: 29.7 PG (ref 26–35)
MCHC RBC AUTO-ENTMCNC: 33.3 G/DL (ref 32–34.5)
MCV RBC AUTO: 89 FL (ref 80–99.9)
MONOCYTES NFR BLD: 0.61 K/UL (ref 0.1–0.95)
MONOCYTES NFR BLD: 8 % (ref 2–12)
NEUTROPHILS NFR BLD: 62 % (ref 43–80)
NEUTS SEG NFR BLD: 4.49 K/UL (ref 1.8–7.3)
PLATELET # BLD AUTO: 202 K/UL (ref 130–450)
PMV BLD AUTO: 11.6 FL (ref 7–12)
POTASSIUM SERPL-SCNC: 4 MMOL/L (ref 3.5–5.1)
PROT SERPL-MCNC: 6.2 G/DL (ref 6.4–8.3)
RBC # BLD AUTO: 4.28 M/UL (ref 3.5–5.5)
SODIUM SERPL-SCNC: 142 MMOL/L (ref 136–145)
T4 SERPL-MCNC: 7.8 UG/DL (ref 4.5–11.7)
TRIGL SERPL-MCNC: 97 MG/DL
TSH SERPL DL<=0.05 MIU/L-ACNC: 2.53 UIU/ML (ref 0.27–4.2)
VLDLC SERPL CALC-MCNC: 19 MG/DL
WBC OTHER # BLD: 7.3 K/UL (ref 4.5–11.5)